# Patient Record
Sex: FEMALE | Race: WHITE | NOT HISPANIC OR LATINO | Employment: FULL TIME | URBAN - METROPOLITAN AREA
[De-identification: names, ages, dates, MRNs, and addresses within clinical notes are randomized per-mention and may not be internally consistent; named-entity substitution may affect disease eponyms.]

---

## 2018-03-09 ENCOUNTER — TRANSCRIBE ORDERS (OUTPATIENT)
Dept: LAB | Facility: HOSPITAL | Age: 23
End: 2018-03-09

## 2018-03-09 ENCOUNTER — LAB (OUTPATIENT)
Dept: LAB | Facility: HOSPITAL | Age: 23
End: 2018-03-09

## 2018-03-09 DIAGNOSIS — Z3A.15 15 WEEKS GESTATION OF PREGNANCY: Primary | ICD-10-CM

## 2018-03-09 DIAGNOSIS — Z34.82 PRENATAL CARE, SUBSEQUENT PREGNANCY, SECOND TRIMESTER: ICD-10-CM

## 2018-03-09 DIAGNOSIS — Z3A.15 15 WEEKS GESTATION OF PREGNANCY: ICD-10-CM

## 2018-03-09 LAB
ABO GROUP BLD: NORMAL
AMPHET+METHAMPHET UR QL: NEGATIVE
AMPHETAMINES UR QL: NEGATIVE
BARBITURATES UR QL SCN: NEGATIVE
BASOPHILS # BLD AUTO: 0.01 10*3/MM3 (ref 0–0.2)
BASOPHILS NFR BLD AUTO: 0.1 % (ref 0–1)
BENZODIAZ UR QL SCN: NEGATIVE
BILIRUB UR QL STRIP: NEGATIVE
BLD GP AB SCN SERPL QL: NEGATIVE
BUPRENORPHINE SERPL-MCNC: NEGATIVE NG/ML
CANNABINOIDS SERPL QL: NEGATIVE
CLARITY UR: CLEAR
COCAINE UR QL: NEGATIVE
COLOR UR: YELLOW
DEPRECATED RDW RBC AUTO: 40.5 FL (ref 37–54)
EOSINOPHIL # BLD AUTO: 0.13 10*3/MM3 (ref 0–0.3)
EOSINOPHIL NFR BLD AUTO: 1.5 % (ref 0–3)
ERYTHROCYTE [DISTWIDTH] IN BLOOD BY AUTOMATED COUNT: 13 % (ref 11.3–14.5)
GLUCOSE BLD-MCNC: 88 MG/DL (ref 70–100)
GLUCOSE UR STRIP-MCNC: NEGATIVE MG/DL
HBV SURFACE AB SER RIA-ACNC: REACTIVE
HBV SURFACE AG SERPL QL IA: NORMAL
HCT VFR BLD AUTO: 36.4 % (ref 34.5–44)
HCV AB SER DONR QL: NORMAL
HGB BLD-MCNC: 12.8 G/DL (ref 11.5–15.5)
HGB UR QL STRIP.AUTO: NEGATIVE
HIV1+2 AB SER QL: NORMAL
IMM GRANULOCYTES # BLD: 0.02 10*3/MM3 (ref 0–0.03)
IMM GRANULOCYTES NFR BLD: 0.2 % (ref 0–0.6)
KETONES UR QL STRIP: NEGATIVE
LEUKOCYTE ESTERASE UR QL STRIP.AUTO: NEGATIVE
LYMPHOCYTES # BLD AUTO: 1.6 10*3/MM3 (ref 0.6–4.8)
LYMPHOCYTES NFR BLD AUTO: 18.9 % (ref 24–44)
MCH RBC QN AUTO: 30 PG (ref 27–31)
MCHC RBC AUTO-ENTMCNC: 35.2 G/DL (ref 32–36)
MCV RBC AUTO: 85.4 FL (ref 80–99)
METHADONE UR QL SCN: NEGATIVE
MONOCYTES # BLD AUTO: 0.68 10*3/MM3 (ref 0–1)
MONOCYTES NFR BLD AUTO: 8 % (ref 0–12)
NEUTROPHILS # BLD AUTO: 6.02 10*3/MM3 (ref 1.5–8.3)
NEUTROPHILS NFR BLD AUTO: 71.3 % (ref 41–71)
NITRITE UR QL STRIP: NEGATIVE
OPIATES UR QL: NEGATIVE
OXYCODONE UR QL SCN: NEGATIVE
PCP UR QL SCN: NEGATIVE
PH UR STRIP.AUTO: 6.5 [PH] (ref 5–8)
PLATELET # BLD AUTO: 290 10*3/MM3 (ref 150–450)
PMV BLD AUTO: 9.5 FL (ref 6–12)
PROPOXYPH UR QL: NEGATIVE
PROT UR QL STRIP: NEGATIVE
RBC # BLD AUTO: 4.26 10*6/MM3 (ref 3.89–5.14)
RH BLD: POSITIVE
RUBV IGG SER QL: ABNORMAL
RUBV IGG SER-ACNC: 7.1 IU/ML
SP GR UR STRIP: 1.01 (ref 1–1.03)
TRICYCLICS UR QL SCN: NEGATIVE
UROBILINOGEN UR QL STRIP: NORMAL
WBC NRBC COR # BLD: 8.46 10*3/MM3 (ref 4.5–13.5)

## 2018-03-09 PROCEDURE — 80081 OBSTETRIC PANEL INC HIV TSTG: CPT

## 2018-03-09 PROCEDURE — 82947 ASSAY GLUCOSE BLOOD QUANT: CPT | Performed by: OBSTETRICS & GYNECOLOGY

## 2018-03-09 PROCEDURE — 86706 HEP B SURFACE ANTIBODY: CPT

## 2018-03-09 PROCEDURE — 80306 DRUG TEST PRSMV INSTRMNT: CPT

## 2018-03-09 PROCEDURE — 86803 HEPATITIS C AB TEST: CPT

## 2018-03-09 PROCEDURE — 81003 URINALYSIS AUTO W/O SCOPE: CPT | Performed by: OBSTETRICS & GYNECOLOGY

## 2018-03-12 LAB — RPR SER QL: NORMAL

## 2018-05-29 ENCOUNTER — TRANSCRIBE ORDERS (OUTPATIENT)
Dept: LAB | Facility: HOSPITAL | Age: 23
End: 2018-05-29

## 2018-05-29 ENCOUNTER — LAB (OUTPATIENT)
Dept: LAB | Facility: HOSPITAL | Age: 23
End: 2018-05-29

## 2018-05-29 DIAGNOSIS — Z34.83 PRENATAL CARE, SUBSEQUENT PREGNANCY, THIRD TRIMESTER: ICD-10-CM

## 2018-05-29 DIAGNOSIS — Z3A.28 28 WEEKS GESTATION OF PREGNANCY: ICD-10-CM

## 2018-05-29 DIAGNOSIS — Z3A.28 28 WEEKS GESTATION OF PREGNANCY: Primary | ICD-10-CM

## 2018-05-29 LAB
BLD GP AB SCN SERPL QL: NEGATIVE
DEPRECATED RDW RBC AUTO: 42.8 FL (ref 37–54)
ERYTHROCYTE [DISTWIDTH] IN BLOOD BY AUTOMATED COUNT: 13.1 % (ref 11.3–14.5)
GLUCOSE 1H P 100 G GLC PO SERPL-MCNC: 113 MG/DL (ref 65–140)
HCT VFR BLD AUTO: 35.6 % (ref 34.5–44)
HGB BLD-MCNC: 12.2 G/DL (ref 11.5–15.5)
MCH RBC QN AUTO: 30.4 PG (ref 27–31)
MCHC RBC AUTO-ENTMCNC: 34.3 G/DL (ref 32–36)
MCV RBC AUTO: 88.8 FL (ref 80–99)
PLATELET # BLD AUTO: 225 10*3/MM3 (ref 150–450)
PMV BLD AUTO: 9.6 FL (ref 6–12)
RBC # BLD AUTO: 4.01 10*6/MM3 (ref 3.89–5.14)
WBC NRBC COR # BLD: 10.55 10*3/MM3 (ref 4.5–13.5)

## 2018-05-29 PROCEDURE — 86850 RBC ANTIBODY SCREEN: CPT

## 2018-05-29 PROCEDURE — 36415 COLL VENOUS BLD VENIPUNCTURE: CPT | Performed by: OBSTETRICS & GYNECOLOGY

## 2018-05-29 PROCEDURE — 85027 COMPLETE CBC AUTOMATED: CPT

## 2018-05-29 PROCEDURE — 82950 GLUCOSE TEST: CPT | Performed by: OBSTETRICS & GYNECOLOGY

## 2018-07-27 LAB — EXTERNAL GROUP B STREP ANTIGEN: NEGATIVE

## 2018-08-15 ENCOUNTER — PREP FOR SURGERY (OUTPATIENT)
Dept: OTHER | Facility: HOSPITAL | Age: 23
End: 2018-08-15

## 2018-08-15 DIAGNOSIS — Z34.90 TERM PREGNANCY: Primary | ICD-10-CM

## 2018-08-15 RX ORDER — SODIUM CHLORIDE, SODIUM LACTATE, POTASSIUM CHLORIDE, CALCIUM CHLORIDE 600; 310; 30; 20 MG/100ML; MG/100ML; MG/100ML; MG/100ML
125 INJECTION, SOLUTION INTRAVENOUS CONTINUOUS
Status: CANCELLED | OUTPATIENT
Start: 2018-08-15

## 2018-08-15 RX ORDER — MAGNESIUM CARB/ALUMINUM HYDROX 105-160MG
30 TABLET,CHEWABLE ORAL ONCE
Status: CANCELLED | OUTPATIENT
Start: 2018-08-15 | End: 2018-08-15

## 2018-08-15 RX ORDER — OXYTOCIN-SODIUM CHLORIDE 0.9% IV SOLN 30 UNIT/500ML 30-0.9/5 UT/ML-%
650 SOLUTION INTRAVENOUS ONCE
Status: CANCELLED | OUTPATIENT
Start: 2018-08-15

## 2018-08-15 RX ORDER — TERBUTALINE SULFATE 1 MG/ML
0.25 INJECTION, SOLUTION SUBCUTANEOUS AS NEEDED
Status: CANCELLED | OUTPATIENT
Start: 2018-08-15

## 2018-08-15 RX ORDER — CARBOPROST TROMETHAMINE 250 UG/ML
250 INJECTION, SOLUTION INTRAMUSCULAR AS NEEDED
Status: CANCELLED | OUTPATIENT
Start: 2018-08-15

## 2018-08-15 RX ORDER — ONDANSETRON 4 MG/1
4 TABLET, FILM COATED ORAL EVERY 6 HOURS PRN
Status: CANCELLED | OUTPATIENT
Start: 2018-08-15

## 2018-08-15 RX ORDER — ACETAMINOPHEN 325 MG/1
650 TABLET ORAL EVERY 4 HOURS PRN
Status: CANCELLED | OUTPATIENT
Start: 2018-08-15

## 2018-08-15 RX ORDER — LIDOCAINE HYDROCHLORIDE 10 MG/ML
5 INJECTION, SOLUTION EPIDURAL; INFILTRATION; INTRACAUDAL; PERINEURAL AS NEEDED
Status: CANCELLED | OUTPATIENT
Start: 2018-08-15

## 2018-08-15 RX ORDER — OXYTOCIN-SODIUM CHLORIDE 0.9% IV SOLN 30 UNIT/500ML 30-0.9/5 UT/ML-%
85 SOLUTION INTRAVENOUS ONCE
Status: CANCELLED | OUTPATIENT
Start: 2018-08-15

## 2018-08-15 RX ORDER — OXYTOCIN-SODIUM CHLORIDE 0.9% IV SOLN 30 UNIT/500ML 30-0.9/5 UT/ML-%
2-24 SOLUTION INTRAVENOUS
Status: CANCELLED | OUTPATIENT
Start: 2018-08-16

## 2018-08-15 RX ORDER — HYDROCODONE BITARTRATE AND ACETAMINOPHEN 5; 325 MG/1; MG/1
1 TABLET ORAL EVERY 4 HOURS PRN
Status: CANCELLED | OUTPATIENT
Start: 2018-08-15 | End: 2018-08-25

## 2018-08-15 RX ORDER — MISOPROSTOL 200 UG/1
800 TABLET ORAL AS NEEDED
Status: CANCELLED | OUTPATIENT
Start: 2018-08-15

## 2018-08-15 RX ORDER — ONDANSETRON 2 MG/ML
4 INJECTION INTRAMUSCULAR; INTRAVENOUS EVERY 6 HOURS PRN
Status: CANCELLED | OUTPATIENT
Start: 2018-08-15

## 2018-08-15 RX ORDER — SODIUM CHLORIDE 0.9 % (FLUSH) 0.9 %
1-10 SYRINGE (ML) INJECTION AS NEEDED
Status: CANCELLED | OUTPATIENT
Start: 2018-08-15

## 2018-08-15 RX ORDER — METHYLERGONOVINE MALEATE 0.2 MG/ML
200 INJECTION INTRAVENOUS ONCE AS NEEDED
Status: CANCELLED | OUTPATIENT
Start: 2018-08-15

## 2018-08-15 RX ORDER — BUTORPHANOL TARTRATE 1 MG/ML
1 INJECTION, SOLUTION INTRAMUSCULAR; INTRAVENOUS
Status: CANCELLED | OUTPATIENT
Start: 2018-08-15

## 2018-08-15 RX ORDER — IBUPROFEN 600 MG/1
600 TABLET ORAL EVERY 6 HOURS PRN
Status: CANCELLED | OUTPATIENT
Start: 2018-08-15

## 2018-08-15 RX ORDER — ZOLPIDEM TARTRATE 5 MG/1
5 TABLET ORAL NIGHTLY PRN
Status: CANCELLED | OUTPATIENT
Start: 2018-08-15 | End: 2018-08-25

## 2018-08-21 ENCOUNTER — APPOINTMENT (OUTPATIENT)
Dept: LABOR AND DELIVERY | Facility: HOSPITAL | Age: 23
End: 2018-08-21

## 2018-08-21 ENCOUNTER — HOSPITAL ENCOUNTER (INPATIENT)
Dept: LABOR AND DELIVERY | Facility: HOSPITAL | Age: 23
LOS: 3 days | Discharge: HOME | End: 2018-08-24
Attending: OBSTETRICS & GYNECOLOGY | Admitting: OBSTETRICS & GYNECOLOGY

## 2018-08-21 DIAGNOSIS — Z34.90 TERM PREGNANCY: ICD-10-CM

## 2018-08-21 LAB
DEPRECATED RDW RBC AUTO: 44.1 FL (ref 37–54)
ERYTHROCYTE [DISTWIDTH] IN BLOOD BY AUTOMATED COUNT: 13.6 % (ref 11.3–14.5)
HCT VFR BLD AUTO: 36.4 % (ref 34.5–44)
HGB BLD-MCNC: 12.4 G/DL (ref 11.5–15.5)
MCH RBC QN AUTO: 30.2 PG (ref 27–31)
MCHC RBC AUTO-ENTMCNC: 34.1 G/DL (ref 32–36)
MCV RBC AUTO: 88.6 FL (ref 80–99)
PLATELET # BLD AUTO: 176 10*3/MM3 (ref 150–450)
PMV BLD AUTO: 11.9 FL (ref 6–12)
RBC # BLD AUTO: 4.11 10*6/MM3 (ref 3.89–5.14)
WBC NRBC COR # BLD: 10.75 10*3/MM3 (ref 4.5–13.5)

## 2018-08-21 PROCEDURE — 86901 BLOOD TYPING SEROLOGIC RH(D): CPT | Performed by: OBSTETRICS & GYNECOLOGY

## 2018-08-21 PROCEDURE — 86850 RBC ANTIBODY SCREEN: CPT | Performed by: OBSTETRICS & GYNECOLOGY

## 2018-08-21 PROCEDURE — 85027 COMPLETE CBC AUTOMATED: CPT | Performed by: OBSTETRICS & GYNECOLOGY

## 2018-08-21 PROCEDURE — 59200 INSERT CERVICAL DILATOR: CPT | Performed by: OBSTETRICS & GYNECOLOGY

## 2018-08-21 PROCEDURE — 86900 BLOOD TYPING SEROLOGIC ABO: CPT | Performed by: OBSTETRICS & GYNECOLOGY

## 2018-08-21 RX ORDER — BUTORPHANOL TARTRATE 1 MG/ML
1 INJECTION, SOLUTION INTRAMUSCULAR; INTRAVENOUS
Status: DISCONTINUED | OUTPATIENT
Start: 2018-08-21 | End: 2018-08-22 | Stop reason: HOSPADM

## 2018-08-21 RX ORDER — PRENATAL WITH FERROUS FUM AND FOLIC ACID 3080; 920; 120; 400; 22; 1.84; 3; 20; 10; 1; 12; 200; 27; 25; 2 [IU]/1; [IU]/1; MG/1; [IU]/1; MG/1; MG/1; MG/1; MG/1; MG/1; MG/1; UG/1; MG/1; MG/1; MG/1; MG/1
1 TABLET ORAL DAILY
COMMUNITY
End: 2021-08-18

## 2018-08-21 RX ORDER — ONDANSETRON 4 MG/1
4 TABLET, FILM COATED ORAL EVERY 6 HOURS PRN
Status: DISCONTINUED | OUTPATIENT
Start: 2018-08-21 | End: 2018-08-22 | Stop reason: HOSPADM

## 2018-08-21 RX ORDER — ONDANSETRON 2 MG/ML
4 INJECTION INTRAMUSCULAR; INTRAVENOUS EVERY 6 HOURS PRN
Status: DISCONTINUED | OUTPATIENT
Start: 2018-08-21 | End: 2018-08-22 | Stop reason: HOSPADM

## 2018-08-21 RX ORDER — TERBUTALINE SULFATE 1 MG/ML
0.25 INJECTION, SOLUTION SUBCUTANEOUS AS NEEDED
Status: DISCONTINUED | OUTPATIENT
Start: 2018-08-21 | End: 2018-08-22 | Stop reason: HOSPADM

## 2018-08-21 RX ORDER — ACETAMINOPHEN 325 MG/1
650 TABLET ORAL EVERY 4 HOURS PRN
Status: DISCONTINUED | OUTPATIENT
Start: 2018-08-21 | End: 2018-08-22 | Stop reason: HOSPADM

## 2018-08-21 RX ORDER — MAGNESIUM CARB/ALUMINUM HYDROX 105-160MG
30 TABLET,CHEWABLE ORAL ONCE
Status: COMPLETED | OUTPATIENT
Start: 2018-08-21 | End: 2018-08-22

## 2018-08-21 RX ORDER — ZOLPIDEM TARTRATE 5 MG/1
5 TABLET ORAL NIGHTLY PRN
Status: DISCONTINUED | OUTPATIENT
Start: 2018-08-21 | End: 2018-08-22 | Stop reason: HOSPADM

## 2018-08-21 RX ORDER — LIDOCAINE HYDROCHLORIDE 10 MG/ML
5 INJECTION, SOLUTION EPIDURAL; INFILTRATION; INTRACAUDAL; PERINEURAL AS NEEDED
Status: DISCONTINUED | OUTPATIENT
Start: 2018-08-21 | End: 2018-08-22 | Stop reason: HOSPADM

## 2018-08-21 RX ORDER — SODIUM CHLORIDE, SODIUM LACTATE, POTASSIUM CHLORIDE, CALCIUM CHLORIDE 600; 310; 30; 20 MG/100ML; MG/100ML; MG/100ML; MG/100ML
125 INJECTION, SOLUTION INTRAVENOUS CONTINUOUS
Status: DISCONTINUED | OUTPATIENT
Start: 2018-08-21 | End: 2018-08-23

## 2018-08-21 RX ORDER — SODIUM CHLORIDE 0.9 % (FLUSH) 0.9 %
1-10 SYRINGE (ML) INJECTION AS NEEDED
Status: DISCONTINUED | OUTPATIENT
Start: 2018-08-21 | End: 2018-08-22 | Stop reason: HOSPADM

## 2018-08-21 RX ORDER — OXYTOCIN-SODIUM CHLORIDE 0.9% IV SOLN 30 UNIT/500ML 30-0.9/5 UT/ML-%
2-30 SOLUTION INTRAVENOUS
Status: DISCONTINUED | OUTPATIENT
Start: 2018-08-22 | End: 2018-08-22 | Stop reason: HOSPADM

## 2018-08-21 RX ADMIN — SODIUM CHLORIDE, POTASSIUM CHLORIDE, SODIUM LACTATE AND CALCIUM CHLORIDE 125 ML/HR: 600; 310; 30; 20 INJECTION, SOLUTION INTRAVENOUS at 23:04

## 2018-08-22 ENCOUNTER — ANESTHESIA (OUTPATIENT)
Dept: LABOR AND DELIVERY | Facility: HOSPITAL | Age: 23
End: 2018-08-22

## 2018-08-22 ENCOUNTER — ANESTHESIA EVENT (OUTPATIENT)
Dept: LABOR AND DELIVERY | Facility: HOSPITAL | Age: 23
End: 2018-08-22

## 2018-08-22 LAB
ABO GROUP BLD: NORMAL
BLD GP AB SCN SERPL QL: NEGATIVE
RH BLD: POSITIVE
T&S EXPIRATION DATE: NORMAL

## 2018-08-22 PROCEDURE — 59025 FETAL NON-STRESS TEST: CPT

## 2018-08-22 PROCEDURE — C1755 CATHETER, INTRASPINAL: HCPCS | Performed by: ANESTHESIOLOGY

## 2018-08-22 PROCEDURE — 25010000002 AMPICILLIN PER 500 MG: Performed by: OBSTETRICS & GYNECOLOGY

## 2018-08-22 PROCEDURE — C1755 CATHETER, INTRASPINAL: HCPCS

## 2018-08-22 PROCEDURE — 25010000002 FENTANYL CITRATE (PF) 100 MCG/2ML SOLUTION: Performed by: NURSE ANESTHETIST, CERTIFIED REGISTERED

## 2018-08-22 PROCEDURE — 0HQ9XZZ REPAIR PERINEUM SKIN, EXTERNAL APPROACH: ICD-10-PCS | Performed by: OBSTETRICS & GYNECOLOGY

## 2018-08-22 PROCEDURE — 88307 TISSUE EXAM BY PATHOLOGIST: CPT | Performed by: OBSTETRICS & GYNECOLOGY

## 2018-08-22 PROCEDURE — 25010000002 ROPIVACAINE PER 1 MG: Performed by: NURSE ANESTHETIST, CERTIFIED REGISTERED

## 2018-08-22 RX ORDER — MISOPROSTOL 200 UG/1
800 TABLET ORAL AS NEEDED
Status: DISCONTINUED | OUTPATIENT
Start: 2018-08-22 | End: 2018-08-22 | Stop reason: HOSPADM

## 2018-08-22 RX ORDER — IBUPROFEN 600 MG/1
600 TABLET ORAL EVERY 6 HOURS PRN
Status: DISCONTINUED | OUTPATIENT
Start: 2018-08-22 | End: 2018-08-22 | Stop reason: HOSPADM

## 2018-08-22 RX ORDER — DIPHENHYDRAMINE HYDROCHLORIDE 50 MG/ML
12.5 INJECTION INTRAMUSCULAR; INTRAVENOUS EVERY 8 HOURS PRN
Status: DISCONTINUED | OUTPATIENT
Start: 2018-08-22 | End: 2018-08-22 | Stop reason: HOSPADM

## 2018-08-22 RX ORDER — LIDOCAINE HYDROCHLORIDE AND EPINEPHRINE 20; 5 MG/ML; UG/ML
INJECTION, SOLUTION EPIDURAL; INFILTRATION; INTRACAUDAL; PERINEURAL AS NEEDED
Status: DISCONTINUED | OUTPATIENT
Start: 2018-08-22 | End: 2018-08-22 | Stop reason: SURG

## 2018-08-22 RX ORDER — OXYTOCIN-SODIUM CHLORIDE 0.9% IV SOLN 30 UNIT/500ML 30-0.9/5 UT/ML-%
650 SOLUTION INTRAVENOUS ONCE
Status: COMPLETED | OUTPATIENT
Start: 2018-08-22 | End: 2018-08-22

## 2018-08-22 RX ORDER — ROPIVACAINE HYDROCHLORIDE 2 MG/ML
15 INJECTION, SOLUTION EPIDURAL; INFILTRATION; PERINEURAL CONTINUOUS
Status: DISCONTINUED | OUTPATIENT
Start: 2018-08-22 | End: 2018-08-23

## 2018-08-22 RX ORDER — FENTANYL CITRATE 50 UG/ML
INJECTION, SOLUTION INTRAMUSCULAR; INTRAVENOUS AS NEEDED
Status: DISCONTINUED | OUTPATIENT
Start: 2018-08-22 | End: 2018-08-22 | Stop reason: SURG

## 2018-08-22 RX ORDER — ONDANSETRON 2 MG/ML
4 INJECTION INTRAMUSCULAR; INTRAVENOUS ONCE AS NEEDED
Status: DISCONTINUED | OUTPATIENT
Start: 2018-08-22 | End: 2018-08-22 | Stop reason: HOSPADM

## 2018-08-22 RX ORDER — TRISODIUM CITRATE DIHYDRATE AND CITRIC ACID MONOHYDRATE 500; 334 MG/5ML; MG/5ML
30 SOLUTION ORAL ONCE
Status: DISCONTINUED | OUTPATIENT
Start: 2018-08-22 | End: 2018-08-22 | Stop reason: HOSPADM

## 2018-08-22 RX ORDER — ACETAMINOPHEN 500 MG
1000 TABLET ORAL ONCE
Status: COMPLETED | OUTPATIENT
Start: 2018-08-22 | End: 2018-08-22

## 2018-08-22 RX ORDER — METHYLERGONOVINE MALEATE 0.2 MG/ML
200 INJECTION INTRAVENOUS ONCE AS NEEDED
Status: DISCONTINUED | OUTPATIENT
Start: 2018-08-22 | End: 2018-08-22 | Stop reason: HOSPADM

## 2018-08-22 RX ORDER — FAMOTIDINE 10 MG/ML
20 INJECTION, SOLUTION INTRAVENOUS ONCE AS NEEDED
Status: DISCONTINUED | OUTPATIENT
Start: 2018-08-22 | End: 2018-08-22 | Stop reason: HOSPADM

## 2018-08-22 RX ORDER — EPHEDRINE SULFATE/0.9% NACL/PF 50 MG/10ML
5 SYRINGE (ML) INTRAVENOUS
Status: DISCONTINUED | OUTPATIENT
Start: 2018-08-22 | End: 2018-08-22 | Stop reason: HOSPADM

## 2018-08-22 RX ORDER — GENTAMICIN SULFATE 60 MG/50ML
2 INJECTION, SOLUTION INTRAVENOUS ONCE
Status: DISCONTINUED | OUTPATIENT
Start: 2018-08-22 | End: 2018-08-23

## 2018-08-22 RX ORDER — HYDROCODONE BITARTRATE AND ACETAMINOPHEN 5; 325 MG/1; MG/1
1 TABLET ORAL EVERY 4 HOURS PRN
Status: DISCONTINUED | OUTPATIENT
Start: 2018-08-22 | End: 2018-08-22 | Stop reason: HOSPADM

## 2018-08-22 RX ORDER — CARBOPROST TROMETHAMINE 250 UG/ML
250 INJECTION, SOLUTION INTRAMUSCULAR AS NEEDED
Status: DISCONTINUED | OUTPATIENT
Start: 2018-08-22 | End: 2018-08-22 | Stop reason: HOSPADM

## 2018-08-22 RX ORDER — METOCLOPRAMIDE HYDROCHLORIDE 5 MG/ML
10 INJECTION INTRAMUSCULAR; INTRAVENOUS ONCE AS NEEDED
Status: DISCONTINUED | OUTPATIENT
Start: 2018-08-22 | End: 2018-08-22 | Stop reason: HOSPADM

## 2018-08-22 RX ORDER — OXYTOCIN-SODIUM CHLORIDE 0.9% IV SOLN 30 UNIT/500ML 30-0.9/5 UT/ML-%
85 SOLUTION INTRAVENOUS ONCE
Status: COMPLETED | OUTPATIENT
Start: 2018-08-22 | End: 2018-08-22

## 2018-08-22 RX ADMIN — SODIUM CHLORIDE, POTASSIUM CHLORIDE, SODIUM LACTATE AND CALCIUM CHLORIDE 125 ML/HR: 600; 310; 30; 20 INJECTION, SOLUTION INTRAVENOUS at 05:44

## 2018-08-22 RX ADMIN — MINERAL OIL 30 ML: 1000 SOLUTION ORAL at 20:35

## 2018-08-22 RX ADMIN — ROPIVACAINE HYDROCHLORIDE 15 ML/HR: 2 INJECTION, SOLUTION EPIDURAL; INFILTRATION at 13:00

## 2018-08-22 RX ADMIN — AMPICILLIN SODIUM 2 G: 2 INJECTION, POWDER, FOR SOLUTION INTRAMUSCULAR; INTRAVENOUS at 19:53

## 2018-08-22 RX ADMIN — FENTANYL CITRATE 100 MCG: 50 INJECTION, SOLUTION INTRAMUSCULAR; INTRAVENOUS at 12:55

## 2018-08-22 RX ADMIN — SODIUM CHLORIDE, POTASSIUM CHLORIDE, SODIUM LACTATE AND CALCIUM CHLORIDE 1000 ML/HR: 600; 310; 30; 20 INJECTION, SOLUTION INTRAVENOUS at 12:28

## 2018-08-22 RX ADMIN — OXYTOCIN-SODIUM CHLORIDE 0.9% IV SOLN 30 UNIT/500ML 85 ML/HR: 30-0.9/5 SOLUTION at 21:02

## 2018-08-22 RX ADMIN — LIDOCAINE HYDROCHLORIDE,EPINEPHRINE BITARTRATE 2 ML: 20; .005 INJECTION, SOLUTION EPIDURAL; INFILTRATION; INTRACAUDAL; PERINEURAL at 12:53

## 2018-08-22 RX ADMIN — OXYTOCIN-SODIUM CHLORIDE 0.9% IV SOLN 30 UNIT/500ML 650 ML/HR: 30-0.9/5 SOLUTION at 20:50

## 2018-08-22 RX ADMIN — ROPIVACAINE HYDROCHLORIDE 8 ML: 5 INJECTION, SOLUTION EPIDURAL; INFILTRATION; PERINEURAL at 12:57

## 2018-08-22 RX ADMIN — SODIUM CHLORIDE, POTASSIUM CHLORIDE, SODIUM LACTATE AND CALCIUM CHLORIDE 125 ML/HR: 600; 310; 30; 20 INJECTION, SOLUTION INTRAVENOUS at 12:54

## 2018-08-22 RX ADMIN — ACETAMINOPHEN 1000 MG: 500 TABLET, FILM COATED ORAL at 19:52

## 2018-08-22 RX ADMIN — OXYTOCIN-SODIUM CHLORIDE 0.9% IV SOLN 30 UNIT/500ML 2 MILLI-UNITS/MIN: 30-0.9/5 SOLUTION at 06:24

## 2018-08-22 NOTE — PLAN OF CARE
Problem: Patient Care Overview  Goal: Plan of Care Review  Outcome: Ongoing (interventions implemented as appropriate)   08/22/18 0826   Coping/Psychosocial   Plan of Care Reviewed With patient   Plan of Care Review   Progress improving     Goal: Individualization and Mutuality  Outcome: Ongoing (interventions implemented as appropriate)   08/22/18 0826   Individualization   Patient Specific Goals (Include Timeframe) healthy baby and healthy mommy     Goal: Discharge Needs Assessment  Outcome: Ongoing (interventions implemented as appropriate)   08/22/18 0826   Discharge Needs Assessment   Concerns to be Addressed no discharge needs identified     Goal: Interprofessional Rounds/Family Conf  Outcome: Ongoing (interventions implemented as appropriate)   08/22/18 0826   Interdisciplinary Rounds/Family Conf   Participants nursing       Problem: Labor (Cervical Ripen, Induct, Augment) (Adult,Obstetrics,Pediatric)  Goal: Signs and Symptoms of Listed Potential Problems Will be Absent, Minimized or Managed (Labor)  Outcome: Ongoing (interventions implemented as appropriate)   08/22/18 0826   Goal/Outcome Evaluation   Problems Assessed (Labor) all   Problems Present (Labor) pain

## 2018-08-22 NOTE — ANESTHESIA PREPROCEDURE EVALUATION
Anesthesia Evaluation     Patient summary reviewed and Nursing notes reviewed   NPO Solid Status: > 6 hours  NPO Liquid Status: > 6 hours           Airway   Mallampati: II  TM distance: >3 FB  Neck ROM: full  Dental      Pulmonary - negative pulmonary ROS   Cardiovascular - negative cardio ROS        Neuro/Psych- negative ROS  GI/Hepatic/Renal/Endo - negative ROS     Musculoskeletal (-) negative ROS    Abdominal    Substance History - negative use     OB/GYN    (+) Pregnant,         Other - negative ROS                       Anesthesia Plan    ASA 2     epidural     Anesthetic plan and risks discussed with patient.

## 2018-08-22 NOTE — NURSING NOTE
1600- new orders per md that may increase pitocin per protocol to 30 mu/min  1855- called dr. khalil to review efm tracing. Discussed interventions of position changes and decreasing pitocin. No new orders at this time. Continue to monitor

## 2018-08-22 NOTE — PROCEDURES
22 y.o.  OB History      Para Term  AB Living    1              SAB TAB Ectopic Molar Multiple Live Births                      Presents as an induction of labour due to unfavourable cervix  Her primary OB requests a Anderson Bulb placement to initiate the induction of labour.    Fetal Heart Rate Assessment   Method:     Beats/min:     Baseline:  150s   Varibility:  mod   Accels:  y   Decels:  n   Tracing Category:  1     TOCO:  Irregular  SVE:  Closed/70/-2    A Anderson Bulb was placed without difficulties with 60 cc of sterile saline.  The patient tolerated the procedure well.

## 2018-08-22 NOTE — PROGRESS NOTES
"08/22/18  4:23 PM  Lara Mclaughlin    SUBJECTIVE: comfortable w/ epidural     ASSESSMENTS:     /64   Pulse 83   Temp 98 °F (36.7 °C)   Resp 16   Ht 162.6 cm (64\")   Wt 73 kg (161 lb)   Breastfeeding? Yes   BMI 27.64 kg/m²     Fetal Heart Rate Assessment   Method: Fetal HR Assessment Method: external   Beats/min: Fetal HR (beats/min): 130   Baseline: Fetal Heart Baseline Rate: normal range   Varibility: Fetal HR Variability: moderate (amplitude range 6 to 25 bpm)   Accels: Fetal HR Accelerations: 15x15   Decels: Fetal HR Decelerations: absent   Tracing Category:  1     Uterine Assessment   Method: Method: IUPC (intrauterine pressure catheter)   Frequency (min): Contraction Frequency (Minutes): 1-3   Ctx Count in 10 min:     Duration:     Intensity: Contraction Intensity: strong by palpation   Intensity by IUPC:     Resting Tone: Uterine Resting Tone: soft by palpation   Resting Tone by IUPC:       Presentation: vtx   Cervix: Exam by: Method: sterile exam per physician   Dilation:  6   Effacement: Cervical Effacement: 90%   Station:  -1    IUPC placed            PLAN  Increased pitocin per protocol  IUPC placed to better titrate  Epidural in place  GBS neg    Emi Zambrano MD  4:23 PM  08/22/18       "

## 2018-08-22 NOTE — ANESTHESIA PROCEDURE NOTES
Labor Epidural    Patient location during procedure: OB  Performed By  Anesthesiologist: CHRIST MACHADO  CRNA: DINA CRUZ  Preanesthetic Checklist  Completed: patient identified, surgical consent, pre-op evaluation, timeout performed, IV checked, risks and benefits discussed and monitors and equipment checked  Prep:  Pt Position:sitting  Sterile Tech:cap, gloves, mask and sterile barrier  Prep:DuraPrep  Monitoring:blood pressure monitoring  Epidural Block Procedure:  Approach:midline  Guidance:palpation technique  Location:L3-L4  Needle Type:Tuohy  Needle Gauge:17 G  Loss of Resistance Medium: saline  Cath Depth at skin:9 cm  Paresthesia: none  Aspiration:negative  Test Dose:negative  Number of Attempts: 1  Post Assessment:  Dressing:occlusive dressing applied and secured with tape  Pt Tolerance:patient tolerated the procedure well with no apparent complications  Complications:no

## 2018-08-22 NOTE — H&P
Sathya  Obstetric History and Physical    Chief Complaint   Patient presents with   • Scheduled Induction       Subjective     Patient is a 22 y.o. female  currently at 40w1d, who presents for induction of labor  for post due date  with unfavorable cervix.    Her prenatal care is benign.  Her previous obstetric/gynecological history is noted for is non-contributory.    The following portions of the patients history were reviewed and updated as appropriate: current medications, allergies, past medical history, past surgical history, past family history, past social history and problem list .       Prenatal Information:   Maternal Prenatal Labs  Blood Type ABO Type   Date Value Ref Range Status   2018 O  Final      Rh Status RH type   Date Value Ref Range Status   2018 Positive  Final      Antibody Screen Antibody Screen   Date Value Ref Range Status   2018 Negative  Final                        Past OB History:       Obstetric History       T0      L0     SAB0   TAB0   Ectopic0   Molar0   Multiple0   Live Births0       # Outcome Date GA Lbr Zeb/2nd Weight Sex Delivery Anes PTL Lv   1 Current                   Past Medical History: History reviewed. No pertinent past medical history.   Past Surgical History History reviewed. No pertinent surgical history.   Family History: History reviewed. No pertinent family history.   Social History:  reports that she has never smoked. She has never used smokeless tobacco.   reports that she does not drink alcohol.   reports that she does not use drugs.        REVIEW OF SYSTEMS              Reports fetal movement is normal             Denies leakage of amniotic fluid.             Denies vaginal bleeding             She reports No contractions             All other systems reviewed and are negative    Objective       Vital Signs Range for the last 24 hours  Temperature: Temp:  [97.7 °F (36.5 °C)-98.1 °F (36.7 °C)] 97.8 °F (36.6 °C)   Temp  Source: Temp src: Oral   BP: BP: (0-128)/(0-79) 120/79   Pulse: Heart Rate:  [66-88] 88   Respirations: Resp:  [16] 16   SPO2:     O2 Amount (l/min):     O2 Devices     Weight: Weight:  [73 kg (161 lb)] 73 kg (161 lb)       Presentation: vertex   Cervix: Exam by:     Dilation:  6   Effacement:  90   Station:  -1    AROM w/ scant clear fluid       Fetal Heart Rate Assessment   Method:     Beats/min:     Baseline:  130   Varibility:  moderate   Accels:  15x15   Decels:  absent   Tracing Category:  1    NST: REACTIVE     Uterine Assessment : q4min              Constitutional:  Well developed, well nourished, no acute distress, well-groomed.   Respiratory:  Lungs are clear to auscultation bilaterally, normal breath sounds.   Cardiovascular:  Normal rate and rhythm, no murmurs.   Gastrointestinal:  Soft, gravid, nontender.  Uterus: Soft, nontender. Fundus appropriate for dates.  Neurologic:  Alert & oriented x 3,  no focal deficits noted.   Psychiatric:  Speech and behavior appropriate.   Extremities: no cyanosis, clubbing or edema, no evidence of DVT.        Labs:  Lab Results   Component Value Date    WBC 10.75 08/21/2018    HGB 12.4 08/21/2018    HCT 36.4 08/21/2018    MCV 88.6 08/21/2018     08/21/2018       Results from last 7 days  Lab Units 08/21/18  2342   ABO TYPING  O   RH TYPING  Positive   ANTIBODY SCREEN  Negative           Assessment/Plan     Active Problems:    Term pregnancy        Assessment:  1.  Intrauterine pregnancy at 40w1d weeks gestation with reactive fetal status.    2.   induction of labor  for post due date  with unfavorable cervix  3.  Obstetrical history significant for is non-contributory.  4.  GBS status: negative    Plan:  1.Anderson bulb for cervical ripening overnight. S/p AROM.  Increase pitocin per protocol. Epidural prn.  2. Plan of care has been reviewed with patient and questions answered.  3.  Risks, benefits of treatment plan have been discussed.  4.  All questions have been  answered.        Emi Zambrano MD  8/22/2018  11:17 AM

## 2018-08-23 LAB
HCT VFR BLD AUTO: 33.3 % (ref 34.5–44)
HGB BLD-MCNC: 11.3 G/DL (ref 11.5–15.5)

## 2018-08-23 PROCEDURE — 85018 HEMOGLOBIN: CPT | Performed by: OBSTETRICS & GYNECOLOGY

## 2018-08-23 PROCEDURE — 85014 HEMATOCRIT: CPT | Performed by: OBSTETRICS & GYNECOLOGY

## 2018-08-23 RX ORDER — ONDANSETRON 4 MG/1
4 TABLET, FILM COATED ORAL EVERY 6 HOURS PRN
Status: DISCONTINUED | OUTPATIENT
Start: 2018-08-23 | End: 2018-08-24 | Stop reason: HOSPADM

## 2018-08-23 RX ORDER — ACETAMINOPHEN 325 MG/1
650 TABLET ORAL EVERY 4 HOURS PRN
Status: DISCONTINUED | OUTPATIENT
Start: 2018-08-23 | End: 2018-08-24 | Stop reason: HOSPADM

## 2018-08-23 RX ORDER — LANOLIN 100 %
OINTMENT (GRAM) TOPICAL
Status: DISCONTINUED | OUTPATIENT
Start: 2018-08-23 | End: 2018-08-24 | Stop reason: HOSPADM

## 2018-08-23 RX ORDER — BISACODYL 10 MG
10 SUPPOSITORY, RECTAL RECTAL DAILY PRN
Status: DISCONTINUED | OUTPATIENT
Start: 2018-08-23 | End: 2018-08-24 | Stop reason: HOSPADM

## 2018-08-23 RX ORDER — SODIUM CHLORIDE 0.9 % (FLUSH) 0.9 %
1-10 SYRINGE (ML) INJECTION AS NEEDED
Status: DISCONTINUED | OUTPATIENT
Start: 2018-08-23 | End: 2018-08-24 | Stop reason: HOSPADM

## 2018-08-23 RX ORDER — HYDROCODONE BITARTRATE AND ACETAMINOPHEN 5; 325 MG/1; MG/1
1 TABLET ORAL EVERY 4 HOURS PRN
Status: DISCONTINUED | OUTPATIENT
Start: 2018-08-23 | End: 2018-08-24 | Stop reason: HOSPADM

## 2018-08-23 RX ORDER — DOCUSATE SODIUM 100 MG/1
100 CAPSULE, LIQUID FILLED ORAL 2 TIMES DAILY PRN
Status: DISCONTINUED | OUTPATIENT
Start: 2018-08-23 | End: 2018-08-24 | Stop reason: HOSPADM

## 2018-08-23 RX ORDER — SIMETHICONE 80 MG
80 TABLET,CHEWABLE ORAL 4 TIMES DAILY PRN
Status: DISCONTINUED | OUTPATIENT
Start: 2018-08-23 | End: 2018-08-24 | Stop reason: HOSPADM

## 2018-08-23 RX ORDER — IBUPROFEN 600 MG/1
600 TABLET ORAL EVERY 6 HOURS PRN
Status: DISCONTINUED | OUTPATIENT
Start: 2018-08-23 | End: 2018-08-24 | Stop reason: HOSPADM

## 2018-08-23 RX ORDER — ONDANSETRON 2 MG/ML
4 INJECTION INTRAMUSCULAR; INTRAVENOUS EVERY 6 HOURS PRN
Status: DISCONTINUED | OUTPATIENT
Start: 2018-08-23 | End: 2018-08-24 | Stop reason: HOSPADM

## 2018-08-23 RX ORDER — PRENATAL VIT/IRON FUM/FOLIC AC 27MG-0.8MG
1 TABLET ORAL DAILY
Status: DISCONTINUED | OUTPATIENT
Start: 2018-08-23 | End: 2018-08-24 | Stop reason: HOSPADM

## 2018-08-23 RX ADMIN — PRENATAL VIT W/ FE FUMARATE-FA TAB 27-0.8 MG 1 TABLET: 27-0.8 TAB at 08:36

## 2018-08-23 RX ADMIN — IBUPROFEN 600 MG: 600 TABLET ORAL at 12:21

## 2018-08-23 RX ADMIN — SIMETHICONE CHEW TAB 80 MG 80 MG: 80 TABLET ORAL at 08:36

## 2018-08-23 RX ADMIN — DOCUSATE SODIUM 100 MG: 100 CAPSULE, LIQUID FILLED ORAL at 06:04

## 2018-08-23 RX ADMIN — HYDROCORTISONE 2.5% 1 APPLICATION: 25 CREAM TOPICAL at 00:37

## 2018-08-23 RX ADMIN — WITCH HAZEL 1 PAD: 500 SOLUTION RECTAL; TOPICAL at 00:37

## 2018-08-23 RX ADMIN — IBUPROFEN 600 MG: 600 TABLET ORAL at 06:04

## 2018-08-23 RX ADMIN — Medication: at 00:37

## 2018-08-23 NOTE — LACTATION NOTE
08/23/18 1735   Maternal Information   Date of Referral 08/23/18   Person Making Referral (courtesy consult)   Maternal Assessment   Breast Size Issue none   Breast Shape Bilateral:;round   Reproductive Interventions   Breastfeeding Support diary/feeding log utilized;encouragement provided;infant-mother separation minimized;lactation counseling provided   Mom states breastfeeding is going well. Baby has had several stool diapers but no one has noticed a void since delivery. Discussed adequate void and stools. Teaching done as documented under Education.Has breast pump at home and will bring to hospital in case it is needed. Left mom and baby in skin to skin and encouraged as much skin to skin as possible. To call for lactation services, if there are questions or concerns

## 2018-08-23 NOTE — ANESTHESIA POSTPROCEDURE EVALUATION
Patient: Lara Mclaughlin    Procedure Summary     Date:  08/22/18 Room / Location:      Anesthesia Start:  1245 Anesthesia Stop:  2046    Procedure:  LABOR ANALGESIA Diagnosis:      Scheduled Providers:   Provider:  Laron Chavez DO    Anesthesia Type:  epidural ASA Status:  2          Anesthesia Type: epidural  Last vitals  BP   100/58 (08/23/18 0430)   Temp   99.6 °F (37.6 °C) (08/23/18 0430)   Pulse   99 (08/23/18 0430)   Resp   16 (08/23/18 0430)     SpO2         Post Anesthesia Care and Evaluation    Patient location during evaluation: bedside  Patient participation: complete - patient participated  Level of consciousness: awake and alert  Pain management: adequate  Airway patency: patent  Anesthetic complications: No anesthetic complications    Cardiovascular status: acceptable  Respiratory status: acceptable  Hydration status: acceptable  Post Neuraxial Block status: Motor and sensory function returned to baseline and No signs or symptoms of PDPH

## 2018-08-23 NOTE — PLAN OF CARE
Problem: Patient Care Overview  Goal: Plan of Care Review  Outcome: Ongoing (interventions implemented as appropriate)   08/23/18 9177   OTHER   Outcome Summary vitals within normal, tolerating po, light lochia, no s/s of infection     Goal: Individualization and Mutuality  Outcome: Ongoing (interventions implemented as appropriate)    Goal: Discharge Needs Assessment  Outcome: Ongoing (interventions implemented as appropriate)    Goal: Interprofessional Rounds/Family Conf  Outcome: Ongoing (interventions implemented as appropriate)

## 2018-08-23 NOTE — L&D DELIVERY NOTE
[facility]  Vaginal Delivery Note    Delivery     Delivery: Vaginal, Spontaneous Delivery     YOB: 2016    Time of Birth: 8:39 PM      Anesthesia: Epidural     Delivering clinician: Emi Zambrano    Forceps?   No   Vacuum? No    Shoulder dystocia present: No        Delivery narrative:  Uncomplicated  over a 1st degree perineal laceration.  loose nuchal cord x 2 easily reduced.  Anterior shoulder delivered with ease.  Infant vigorous at delivery so placed on maternal abdomen.  Delayed cord clamping x 1 min.  Cord doubly clamped and cut by mom.  Laceration repaired in standard fashion using 3-0 vicryl.      Infant    Findings: male  infant     Infant observations: Weight: 3365 g (7 lb 6.7 oz)   Length: 20.5  in  Observations/Comments:         Apgars:   8 @ 1 minute /      9 @ 5 minutes   Infant Name: Emory     Placenta, Cord, and Fluid    Placenta delivered  Spontaneous  at   2018  8:46 PM     Cord:    present.   Nuchal Cord?  yes; Number of nuchal loops present:  2    Cord blood obtained: Yes    Cord gases obtained:  No    Cord gas results: Venous:  No results found for: PHCVEN    Arterial:  No results found for: PHCART     Repair    Episiotomy: None    Lacerations: Yes  Laceration Information  Laceration Repaired?   Perineal: 1st  Yes    Periurethral: bilateral  Yes    Labial:         Sulcus:         Vaginal: No       Cervical: No          Suture used for repair: 4-0 Vicryl and 3-0 Vicryl   Estimated Blood Loss: Est. Blood Loss (mL): 200 mL (Filed from Delivery Summary) (18)           Complications  none    Disposition  Mother to Mother Baby/Postpartum  in stable condition currently.  Baby to remains with mom  in stable condition currently.      Emi Zambrano MD  18  9:55 PM

## 2018-08-23 NOTE — PROGRESS NOTES
Called due to temp of 102.  Will presumptively treat for chorioamnionitis w/ ampicillin 2g and will have pharmacy dose gentamycin.  FHT with moderate variability, accels, and rare variable decels.  Cat 2, overall very reassuring.  Continue close monitoring.    Emi Zambrano MD  8:02 PM

## 2018-08-24 VITALS
HEART RATE: 77 BPM | TEMPERATURE: 98.1 F | HEIGHT: 64 IN | RESPIRATION RATE: 14 BRPM | DIASTOLIC BLOOD PRESSURE: 66 MMHG | SYSTOLIC BLOOD PRESSURE: 118 MMHG | BODY MASS INDEX: 27.49 KG/M2 | WEIGHT: 161 LBS

## 2018-08-24 PROBLEM — Z34.90 TERM PREGNANCY: Status: RESOLVED | Noted: 2018-08-21 | Resolved: 2018-08-24

## 2018-08-24 RX ORDER — IBUPROFEN 600 MG/1
600 TABLET ORAL EVERY 6 HOURS PRN
Qty: 30 TABLET | Refills: 0 | Status: SHIPPED | OUTPATIENT
Start: 2018-08-24 | End: 2021-08-18

## 2018-08-24 RX ORDER — PSEUDOEPHEDRINE HCL 30 MG
100 TABLET ORAL 2 TIMES DAILY PRN
Qty: 60 CAPSULE | Refills: 2 | Status: SHIPPED | OUTPATIENT
Start: 2018-08-24 | End: 2021-08-18

## 2018-08-24 RX ADMIN — PRENATAL VIT W/ FE FUMARATE-FA TAB 27-0.8 MG 1 TABLET: 27-0.8 TAB at 09:07

## 2018-08-24 RX ADMIN — WITCH HAZEL 1 PAD: 500 SOLUTION RECTAL; TOPICAL at 17:10

## 2018-08-24 RX ADMIN — IBUPROFEN 600 MG: 600 TABLET ORAL at 15:35

## 2018-08-24 RX ADMIN — WITCH HAZEL 1 PAD: 500 SOLUTION RECTAL; TOPICAL at 15:26

## 2018-08-24 RX ADMIN — IBUPROFEN 600 MG: 600 TABLET ORAL at 09:07

## 2018-08-24 RX ADMIN — MEASLES, MUMPS, AND RUBELLA VIRUS VACCINE LIVE 0.5 ML: 1000; 12500; 1000 INJECTION, POWDER, LYOPHILIZED, FOR SUSPENSION SUBCUTANEOUS at 09:07

## 2018-08-24 RX ADMIN — DOCUSATE SODIUM 100 MG: 100 CAPSULE, LIQUID FILLED ORAL at 09:07

## 2018-08-24 NOTE — LACTATION NOTE
08/24/18 1115   Maternal Information   Date of Referral 08/24/18   Person Making Referral (fu consult)   Maternal Reason for Referral (latch uncomfortable)   Maternal Assessment   Breast Size Issue none   Breast Shape Bilateral:;round   Breast Density Bilateral:;soft   Nipples Bilateral:;everted   Left Nipple Symptoms redness;tender   Right Nipple Symptoms redness;tender   Reproductive Interventions   Breastfeeding Support diary/feeding log utilized;encouragement provided;infant-mother separation minimized;lactation counseling provided   Mom states nipples are sore at beginning of feedings and they hurt after feeding. Encouraged to call lactation to help with feeding before discharge home. Discussed pumping after feedings, if nipples don't improve. Teaching done, as documented under Education. Will call for help with feeding before dc home.

## 2018-08-24 NOTE — PROGRESS NOTES
8/24/2018  PPD #2    Subjective   Lara feels well.  Patient describes her lochia as less than menses.  Pain is well controlled       Objective   Temp: Temp:  [97.4 °F (36.3 °C)-98 °F (36.7 °C)] 98 °F (36.7 °C) Temp src: Oral   BP: BP: (108-118)/(51-72) 118/72        Pulse: Heart Rate:  [66-72] 72  RR: Resp:  [16] 16    General:  No acute distress   Abdomen: Fundus firm and beneath umbilicus   Pelvis: deferred     Lab Results   Component Value Date    WBC 10.75 08/21/2018    HGB 11.3 (L) 08/23/2018    HCT 33.3 (L) 08/23/2018    MCV 88.6 08/21/2018     08/21/2018    HEPBSAG Non-Reactive 03/09/2018       Assessment  1. PPD# 2 after vaginal delivery    Plan  1. Discharge to home  2. Follow up with [facility]  in 6 weeks  3. Advised no tampons, intercourse, or tub baths for 6 weeks.       This note has been electronically signed.    Emi Zambrano MD  7:53 AM  August 24, 2018

## 2018-08-24 NOTE — PLAN OF CARE
Problem: Patient Care Overview  Goal: Plan of Care Review  Outcome: Ongoing (interventions implemented as appropriate)   08/24/18 0604   Coping/Psychosocial   Plan of Care Reviewed With patient;mother   Plan of Care Review   Progress improving   OTHER   Outcome Summary VSS. Fundus and lochia WDL. Pain controlled. Breastfeeding well     Goal: Individualization and Mutuality  Outcome: Ongoing (interventions implemented as appropriate)    Goal: Discharge Needs Assessment  Outcome: Ongoing (interventions implemented as appropriate)   08/24/18 0604   Discharge Needs Assessment   Concerns to be Addressed no discharge needs identified     Goal: Interprofessional Rounds/Family Conf  Outcome: Ongoing (interventions implemented as appropriate)   08/24/18 0604   Interdisciplinary Rounds/Family Conf   Participants nursing;patient;family;physician       Problem: Breastfeeding (Adult,Obstetrics,Pediatric)  Goal: Signs and Symptoms of Listed Potential Problems Will be Absent, Minimized or Managed (Breastfeeding)  Outcome: Ongoing (interventions implemented as appropriate)   08/24/18 0604   Goal/Outcome Evaluation   Problems Assessed (Breastfeeding) all   Problems Present (Breastfeeding) none       Problem: Postpartum (Vaginal Delivery) (Adult,Obstetrics,Pediatric)  Goal: Signs and Symptoms of Listed Potential Problems Will be Absent, Minimized or Managed (Postpartum)  Outcome: Ongoing (interventions implemented as appropriate)   08/24/18 0604   Goal/Outcome Evaluation   Problems Assessed (Postpartum Vaginal Delivery) all   Problems Present (Postpartum Vag Deliv) none

## 2018-08-24 NOTE — DISCHARGE SUMMARY
[facility]  Vaginal Delivery Discharge Summary      Patient: Lara Mclaughlin      MR#:[id number]  Admission  Diagnosis: term pregnancy  Discharge Diagnosis: Same    Date of Admission: 8/21/2018  Date of Discharge:  8/24/2018    Procedures:  Vaginal, Spontaneous Delivery     8/22/2018    8:39 PM      Service:  Obstetrics    Hospital Course:  Patient underwent vaginal delivery and remained in the hospital for 3 days.  During that time she remained afebrile and hemodynamically stable.  On the day of discharge, she was eating, ambulating and voiding without difficulty.      Lab Results   Component Value Date    WBC 10.75 08/21/2018    HGB 11.3 (L) 08/23/2018    HCT 33.3 (L) 08/23/2018    MCV 88.6 08/21/2018     08/21/2018       Results from last 7 days  Lab Units 08/21/18  4892   ABO TYPING  O   RH TYPING  Positive   ANTIBODY SCREEN  Negative       Discharge Medications     Discharge Medications      New Medications      Instructions Start Date   docusate sodium 100 MG capsule   100 mg, Oral, 2 Times Daily PRN      ibuprofen 600 MG tablet  Commonly known as:  ADVIL,MOTRIN   600 mg, Oral, Every 6 Hours PRN         Continue These Medications      Instructions Start Date   Prenatal 27-1 27-1 MG tablet tablet   1 tablet, Oral, Daily             Discharge Disposition:  To Home    Discharge Condition:  Stable    Discharge Diet: regular    Activity at Discharge: Pelvic rest    Follow-up Appointments  6 weeks      Emi Zambrano MD  08/24/18  7:54 AM

## 2018-08-24 NOTE — PLAN OF CARE
Problem: Patient Care Overview  Goal: Plan of Care Review  Outcome: Outcome(s) achieved Date Met: 08/24/18    Goal: Individualization and Mutuality  Outcome: Outcome(s) achieved Date Met: 08/24/18    Goal: Discharge Needs Assessment  Outcome: Outcome(s) achieved Date Met: 08/24/18    Goal: Interprofessional Rounds/Family Conf  Outcome: Outcome(s) achieved Date Met: 08/24/18      Problem: Breastfeeding (Adult,Obstetrics,Pediatric)  Goal: Signs and Symptoms of Listed Potential Problems Will be Absent, Minimized or Managed (Breastfeeding)  Outcome: Outcome(s) achieved Date Met: 08/24/18      Problem: Postpartum (Vaginal Delivery) (Adult,Obstetrics,Pediatric)  Goal: Signs and Symptoms of Listed Potential Problems Will be Absent, Minimized or Managed (Postpartum)  Outcome: Outcome(s) achieved Date Met: 08/24/18

## 2018-08-30 LAB
CYTO UR: NORMAL
LAB AP CASE REPORT: NORMAL
LAB AP CLINICAL INFORMATION: NORMAL
LAB AP DIAGNOSIS COMMENT: NORMAL
PATH REPORT.FINAL DX SPEC: NORMAL
PATH REPORT.GROSS SPEC: NORMAL

## 2020-12-27 ENCOUNTER — APPOINTMENT (OUTPATIENT)
Dept: PREADMISSION TESTING | Facility: HOSPITAL | Age: 25
End: 2020-12-27

## 2021-08-18 ENCOUNTER — OFFICE VISIT (OUTPATIENT)
Dept: INTERNAL MEDICINE | Facility: CLINIC | Age: 26
End: 2021-08-18

## 2021-08-18 VITALS
DIASTOLIC BLOOD PRESSURE: 84 MMHG | RESPIRATION RATE: 12 BRPM | HEIGHT: 64 IN | OXYGEN SATURATION: 99 % | SYSTOLIC BLOOD PRESSURE: 120 MMHG | BODY MASS INDEX: 25.61 KG/M2 | HEART RATE: 67 BPM | WEIGHT: 150 LBS

## 2021-08-18 DIAGNOSIS — Z76.89 ENCOUNTER TO ESTABLISH CARE: ICD-10-CM

## 2021-08-18 DIAGNOSIS — F90.2 ATTENTION DEFICIT HYPERACTIVITY DISORDER (ADHD), COMBINED TYPE: Primary | ICD-10-CM

## 2021-08-18 PROCEDURE — 99203 OFFICE O/P NEW LOW 30 MIN: CPT | Performed by: NURSE PRACTITIONER

## 2021-08-18 RX ORDER — ATOMOXETINE 40 MG/1
40 CAPSULE ORAL DAILY
Qty: 30 CAPSULE | Refills: 0 | Status: SHIPPED | OUTPATIENT
Start: 2021-08-18

## 2023-09-07 ENCOUNTER — LAB (OUTPATIENT)
Dept: LAB | Facility: HOSPITAL | Age: 28
End: 2023-09-07
Payer: COMMERCIAL

## 2023-09-07 ENCOUNTER — TRANSCRIBE ORDERS (OUTPATIENT)
Dept: LAB | Facility: HOSPITAL | Age: 28
End: 2023-09-07
Payer: COMMERCIAL

## 2023-09-07 DIAGNOSIS — R30.0 DYSURIA: Primary | ICD-10-CM

## 2023-09-07 DIAGNOSIS — R30.0 DYSURIA: ICD-10-CM

## 2023-09-07 PROCEDURE — 87086 URINE CULTURE/COLONY COUNT: CPT

## 2023-09-09 LAB — BACTERIA SPEC AEROBE CULT: NORMAL

## 2023-09-14 ENCOUNTER — TRANSCRIBE ORDERS (OUTPATIENT)
Dept: LAB | Facility: HOSPITAL | Age: 28
End: 2023-09-14
Payer: COMMERCIAL

## 2023-09-14 ENCOUNTER — LAB (OUTPATIENT)
Dept: LAB | Facility: HOSPITAL | Age: 28
End: 2023-09-14
Payer: COMMERCIAL

## 2023-09-14 DIAGNOSIS — Z20.2 VENEREAL DISEASE CONTACT: ICD-10-CM

## 2023-09-14 DIAGNOSIS — Z20.2 VENEREAL DISEASE CONTACT: Primary | ICD-10-CM

## 2023-09-14 PROCEDURE — G0432 EIA HIV-1/HIV-2 SCREEN: HCPCS

## 2023-09-14 PROCEDURE — 87340 HEPATITIS B SURFACE AG IA: CPT

## 2023-09-14 PROCEDURE — 86803 HEPATITIS C AB TEST: CPT

## 2023-09-14 PROCEDURE — 86592 SYPHILIS TEST NON-TREP QUAL: CPT

## 2023-09-14 PROCEDURE — 36415 COLL VENOUS BLD VENIPUNCTURE: CPT

## 2023-09-15 LAB
HBV SURFACE AG SERPL QL IA: NORMAL
HCV AB SER DONR QL: NORMAL
HIV 1+2 AB+HIV1 P24 AG SERPL QL IA: NORMAL
RPR SER QL: NORMAL

## 2025-04-24 ENCOUNTER — OFFICE VISIT (OUTPATIENT)
Dept: INTERNAL MEDICINE | Facility: CLINIC | Age: 30
End: 2025-04-24
Payer: OTHER GOVERNMENT

## 2025-04-24 VITALS
DIASTOLIC BLOOD PRESSURE: 70 MMHG | HEART RATE: 82 BPM | OXYGEN SATURATION: 98 % | WEIGHT: 145 LBS | TEMPERATURE: 96.8 F | HEIGHT: 64 IN | SYSTOLIC BLOOD PRESSURE: 102 MMHG | RESPIRATION RATE: 14 BRPM | BODY MASS INDEX: 24.75 KG/M2

## 2025-04-24 DIAGNOSIS — Z76.89 ENCOUNTER TO ESTABLISH CARE: Primary | ICD-10-CM

## 2025-04-24 DIAGNOSIS — Z00.00 HEALTHCARE MAINTENANCE: ICD-10-CM

## 2025-04-24 DIAGNOSIS — F90.9 ATTENTION DEFICIT HYPERACTIVITY DISORDER (ADHD), UNSPECIFIED ADHD TYPE: ICD-10-CM

## 2025-04-24 PROBLEM — F90.8 OTHER SPECIFIED ATTENTION DEFICIT HYPERACTIVITY DISORDER (ADHD): Status: ACTIVE | Noted: 2025-04-24

## 2025-04-24 NOTE — PROGRESS NOTES
New Patient Office Visit      Date: 2025  Patient Name: Lara Mclaughlin  : 1995   MRN: [id number]     Chief Complaint:    Chief Complaint   Patient presents with    Establish Care    ADHD       History of Present Illness: Lara Mclaughlin is a 29 y.o. female who is here today to establish care.  Past medical, surgical, family and social history as outlined below.     Concerned today for ADHD. Was working from home and was able to manage sx but is now moving back into office and is concerned.  Has tried atomoxetine- made her emotional and felt that it changed her personality. Also tried Wellbutrin and did not like the way it made her feel.     Seeing OBGYN today to update PAP smear.    No further concerns today.     Subjective      Review of Systems:   Review of Systems   Psychiatric/Behavioral:  Positive for decreased concentration.        Past Medical History:   Past Medical History:   Diagnosis Date    ADHD (attention deficit hyperactivity disorder)     Allergic        Past Surgical History:   Past Surgical History:   Procedure Laterality Date    TONSILLECTOMY      WISDOM TOOTH EXTRACTION         Family History:   Family History   Problem Relation Age of Onset    Cancer Mother         skin    Cancer Maternal Grandmother 70 - 79        lung       Social History:   Social History     Socioeconomic History    Marital status: Single   Tobacco Use    Smoking status: Never    Smokeless tobacco: Never   Vaping Use    Vaping status: Never Used   Substance and Sexual Activity    Alcohol use: Yes     Alcohol/week: 3.0 standard drinks of alcohol     Types: 3 Glasses of wine per week     Comment: Socially    Drug use: No    Sexual activity: Yes     Partners: Male     Birth control/protection: I.U.D.       Medications:   No current outpatient medications on file.    Allergies:   Allergies   Allergen Reactions    Etonogestrel-Ethinyl Estradiol Nausea And Vomiting    Lactose Unknown (See Comments)  "      Objective     Physical Exam:  Vital Signs:   Vitals:    04/24/25 0825   BP: 102/70   Pulse: 82   Resp: 14   Temp: 96.8 °F (36 °C)   TempSrc: Temporal   SpO2: 98%   Weight: 65.8 kg (145 lb)   Height: 162.6 cm (64.02\")   PainSc: 0-No pain     Body mass index is 24.88 kg/m².   Facility age limit for growth %rosenda is 20 years.  BMI is within normal parameters. No other follow-up for BMI required.       Physical Exam  Vitals and nursing note reviewed.   Constitutional:       General: She is not in acute distress.     Appearance: Normal appearance.   Eyes:      Extraocular Movements: Extraocular movements intact.      Conjunctiva/sclera: Conjunctivae normal.   Cardiovascular:      Rate and Rhythm: Normal rate and regular rhythm.      Pulses: Normal pulses.      Heart sounds: Normal heart sounds.   Pulmonary:      Effort: Pulmonary effort is normal. No respiratory distress.   Neurological:      General: No focal deficit present.      Mental Status: She is alert and oriented to person, place, and time. Mental status is at baseline.   Psychiatric:         Mood and Affect: Mood normal.         Behavior: Behavior normal.           Assessment / Plan      Assessment/Plan:      Encounter to establish care  - Records reviewed  Attention deficit hyperactivity disorder (ADHD), unspecified ADHD type  - Has tried atomoxetine and Wellbutrin in the past, will refer patient to behavioral health for further evaluation. Also provided patient with contact information for ADHD testing ([facility]- [phone] and [facility] [facility])     Orders:    Ambulatory Referral to Behavioral Health    Healthcare maintenance    Orders:    CBC Auto Differential; Future    Comprehensive Metabolic Panel; Future    Hemoglobin A1c; Future    Lipid Panel; Future    TSH Rfx On Abnormal To Free T4; Future           Follow Up:   Return in about 1 month (around 5/24/2025) for Annual.      [address] [place]   "

## 2025-04-25 ENCOUNTER — PATIENT ROUNDING (BHMG ONLY) (OUTPATIENT)
Dept: INTERNAL MEDICINE | Facility: CLINIC | Age: 30
End: 2025-04-25
Payer: OTHER GOVERNMENT

## 2025-04-25 NOTE — PROGRESS NOTES
A My-chart message has been sent to the patient for [place] [facility] [place]

## 2025-05-13 ENCOUNTER — OFFICE VISIT (OUTPATIENT)
Dept: BEHAVIORAL HEALTH | Facility: CLINIC | Age: 30
End: 2025-05-13
Payer: OTHER GOVERNMENT

## 2025-05-13 VITALS
SYSTOLIC BLOOD PRESSURE: 110 MMHG | OXYGEN SATURATION: 97 % | HEART RATE: 74 BPM | BODY MASS INDEX: 24.86 KG/M2 | HEIGHT: 64 IN | DIASTOLIC BLOOD PRESSURE: 70 MMHG | WEIGHT: 145.6 LBS

## 2025-05-13 DIAGNOSIS — F90.2 ATTENTION DEFICIT HYPERACTIVITY DISORDER, COMBINED TYPE: Primary | ICD-10-CM

## 2025-05-13 DIAGNOSIS — Z13.30 ENCOUNTER FOR BEHAVIORAL HEALTH SCREENING: ICD-10-CM

## 2025-05-13 PROBLEM — F90.8 OTHER SPECIFIED ATTENTION DEFICIT HYPERACTIVITY DISORDER (ADHD): Status: RESOLVED | Noted: 2025-04-24 | Resolved: 2025-05-13

## 2025-05-13 RX ORDER — COPPER 313.4 MG/1
INTRAUTERINE DEVICE INTRAUTERINE
COMMUNITY

## 2025-05-13 RX ORDER — DEXTROAMPHETAMINE SACCHARATE, AMPHETAMINE ASPARTATE, DEXTROAMPHETAMINE SULFATE AND AMPHETAMINE SULFATE 2.5; 2.5; 2.5; 2.5 MG/1; MG/1; MG/1; MG/1
10 TABLET ORAL DAILY
Qty: 30 TABLET | Refills: 0 | Status: SHIPPED | OUTPATIENT
Start: 2025-05-13

## 2025-05-13 NOTE — PROGRESS NOTES
New Patient Office Visit      Patient Name: Lara Mclaughlin  : 1995   MRN: [id number]     Referring Provider: Charline Gomez PA-C    Chief Complaint:      ICD-10-CM ICD-9-CM   1. Attention deficit hyperactivity disorder, combined type  F90.2 314.01   2. Encounter for behavioral health screening  Z13.30 V82.89        History of Present Illness:   Lara Mclaughlin is a 29 y.o. female who is here today for evaluation and medication management.    History of Present Illness  The patient presents for evaluation of ADHD.    She was previously diagnosed with ADHD in  by her primary care provider and was prescribed Strattera. However, she discontinued the medication due to increased irritability and found it easier to manage her symptoms while working remotely. Recently, she transitioned to an in-office job, which has been challenging due to the increased distractions. Her general provider recommended a re-evaluation of her ADHD diagnosis, but this has not yet been completed. She reports difficulty staying on task at work, often juggling multiple assignments simultaneously. She also experiences impulsivity, such as deciding to travel to [place] on short notice, which has occasionally led to relationship and financial issues. She frequently misplaces items, which negatively impacts her mental health. She reports no obsessive thoughts or actions.    Social History:  - Lives with her 6-year-old son  - Strong support system from parents and sisters  - Enjoys staying active through sports    Psychiatric History:  - No other mental health diagnoses, including anxiety or depression  - Engaged in talk therapy last year, found it beneficial  - No suicidal thoughts, self-harm behaviors, or psychiatric hospitalizations      Results:  - ADHD screener results indicate frequent difficulties with attention, organization, and impulsivity    She reports no significant changes in appetite or weight. She has been back  in the office for 2 weeks and reports difficulty completing paperwork. She occasionally experiences difficulty falling asleep or maintaining sleep, with at least one restless night per week. She sometimes experiences nightmares. She believes her developmental milestones were achieved on time. She graduated from high school and college, performing well academically despite the effort required to maintain focus. She finds motivation in her son and enjoys staying active through sports.    SOCIAL HISTORY  The patient drinks alcohol 2 to 3 times a week, often getting drunk on weekends. The patient does not use marijuana, illicit substances, tobacco, or nicotine. The patient occasionally drinks coffee at the office.    FAMILY HISTORY  The patient's aunt has depression and may have had a suicide attempt.       Current Treatments: see problem list  Medications: see medication  Therapy: talk therapy was helpful, but none currently    Subjective      Review of Systems:   Review of Systems   Constitutional:  Negative for appetite change and unexpected weight change.   Eyes:  Negative for visual disturbance.   Respiratory:  Negative for chest tightness and shortness of breath.    Cardiovascular:  Negative for chest pain.   Musculoskeletal:  Negative for gait problem.   Skin:  Negative for rash and wound.   Neurological:  Negative for dizziness, tremors, seizures, weakness and light-headedness.   Psychiatric/Behavioral:  Positive for decreased concentration. Negative for agitation, behavioral problems, confusion, dysphoric mood, hallucinations, self-injury, sleep disturbance and suicidal ideas. The patient is not nervous/anxious and is not hyperactive.      Sleep pattern: varies, mostly well rested, 1 bad night per week, some nightmares  Appetite: decreased appetite     Past Medical History:   Past Medical History:   Diagnosis Date    ADHD (attention deficit hyperactivity disorder)     Allergic        Past Surgical History:    Past Surgical History:   Procedure Laterality Date    TONSILLECTOMY      WISDOM TOOTH EXTRACTION         Family History:   Family History   Problem Relation Age of Onset    Cancer Mother         skin    Cancer Maternal Grandmother 70 - 79        lung         PHQ-9 Depression Screening  Little interest or pleasure in doing things? Not at all   Feeling down, depressed, or hopeless? Not at all   PHQ-2 Total Score 0   Trouble falling or staying asleep, or sleeping too much? Several days   Feeling tired or having little energy? Several days   Poor appetite or overeating? Not at all   Feeling bad about yourself - or that you are a failure or have let yourself or your family down? Not at all   Trouble concentrating on things, such as reading the newspaper or watching television? Almost all   Moving or speaking so slowly that other people could have noticed? Or the opposite - being so fidgety or restless that you have been moving around a lot more than usual? Over half   Thoughts that you would be better off dead, or of hurting yourself in some way? Not at all   PHQ-9 Total Score 7   If you checked off any problems, how difficult have these problems made it for you to do your work, take care of things at home, or get along with other people? Very difficult       ELIA-7 Anxiety Screening  Over the last two weeks, how often have you been bothered by the following problems?  Feeling nervous, anxious or on edge: Several days  Not being able to stop or control worrying: Several days  Worrying too much about different things: Several days  Trouble Relaxing: More than half the days  Being so restless that it is hard to sit still: More than half the days  Becoming easily annoyed or irritable: Several days  Feeling afraid as if something awful might happen: Not at all  ELIA 7 Total Score: 8  If you checked any problems, how difficult have these problems made it for you to do your work, take care of things at home, or get along with  other people: Somewhat difficult    Psychiatric History:     Previous medications: straterra, hydroxyzine  Inpatient admissions: denies  History of suicide/self harm attempts: denies  Trauma/Abuse History: denies  Developmental History: on target  Patient denies any history of carrie or hypomania, although has traveled impulsively. No thought disturbances or psychosis reported with no thought blocking or thought broadcasting noted. No history of eating disorders, OCD or panic. Patient denies symptoms of depression including hopelessness, helplessness, and anhedonia. Denies history of self harm or suicidal ideation. She denies any homicidal thinking. Denies history of abuse (verbal, physical or sexual) and denies any symptoms of PTSD.  No personality disorders noted at this time.    RISK ASSESSMENT:    Does patient have intent for suicide? denies  Does patient have thoughts of suicide? denies  Does patient have a current plan for suicide? denies  Access to firearms or weapons: denies  History of suicide attempts: denies  History of homicidal ideation: denies  Family history of suicide or attempts: denies  Risk Taking/Impulsive Behavior (current or past): impulsive travel Describe: None   Protective factors: son, likes job    Social History:    Highest level of education obtained: college, school always a struggle  Living situation: son 6 yr  Patient's Occupation: back in office, , 2 weeks   Leisure and Recreation:  son sports, kickball  Support system: parents and sisters  Illicit substance use: denies  Alcohol use: 2-3 times a week, on the weekends to get drunk, 5 drinks  Tobacco use: denies  Caffeine: occasional cup of coffee    Legal History:   No legal history noted today.     Medications:     Current Outpatient Medications:     amphetamine-dextroamphetamine (Adderall) 10 MG tablet, Take 1 tablet by mouth Daily., Disp: 30 tablet, Rfl: 0    Paragard Intrauterine Copper intrauterine device IUD,  "Paragard Intrauterine Copper, Disp: , Rfl:     Medication Considerations:  RICHELLE reviewed and appropriate.      Allergies:   Allergies   Allergen Reactions    Etonogestrel-Ethinyl Estradiol Nausea And Vomiting    Lactose Unknown (See Comments)          Objective     Physical Exam:  Vital Signs:   Vitals:    05/13/25 1506 05/13/25 1509 05/13/25 1511 05/13/25 1512   BP:    110/70   Pulse:   74    SpO2:   97%    Weight:  66 kg (145 lb 9.6 oz)     Height: 162.2 cm (63.86\")        Body mass index is 25.1 kg/m².     Mental Status Exam:   Hygiene:   good  Cooperation:  Cooperative  Eye Contact:  Good  Psychomotor Behavior:  Appropriate  Affect:  Appropriate  Mood: normal  Speech:  Normal  Thought Process:  Goal directed and Linear  Thought Content:  Normal  Suicidal:  None  Homicidal:  None  Hallucinations:  None  Delusion:  None  Memory:  Intact  Orientation:  Person, Place, Time, and Situation  Reliability:  good  Insight:  Good  Judgement:  Good  Impulse Control:  Good  Physical/Medical Issues:   see problem list      Assessment / Plan      Visit Diagnosis/Orders Placed This Visit:  Diagnoses and all orders for this visit:    1. Attention deficit hyperactivity disorder, combined type (Primary)  -     Ambulatory Referral to Behavioral Health  -     amphetamine-dextroamphetamine (Adderall) 10 MG tablet; Take 1 tablet by mouth Daily.  Dispense: 30 tablet; Refill: 0    2. Encounter for behavioral health screening  -     Ambulatory Referral to Behavioral Health              Functional Status: No impairment    Prognosis: Good with Ongoing Treatment     Impression/Formulation:  Patient appeared alert and oriented.  Patient is voluntarily requesting to begin outpatient psychiatric treatment at [facility]. Patient is receptive to assistance with maintaining a stable lifestyle.  Patient presents with history of     ICD-10-CM ICD-9-CM   1. Attention deficit hyperactivity disorder, combined type  F90.2 " 314.01   2. Encounter for behavioral health screening  Z13.30 V82.89   Reviewed patient's previous provider notes. Reviewed most recent labs. Patient meets DSM V diagnostic criteria for diagnoses. Diagnoses may be updated as more information becomes available.    Differential diagnoses include: MDD, ELIA    Assessment & Plan    The patient discussed her challenges with managing ADHD symptoms, particularly in an office environment with increased distractions. She reported difficulty staying on task, impulsivity, and frequent distraction. The patient also shared her history with ADHD diagnosis and treatment, including previous use of Strattera, which caused irritability. She expressed interest in therapy, preferably in-person, as telehealth was less effective for her. The patient mentioned occasional sleep disturbances and impulsive behaviors, such as spontaneous travel and social events impacting her responsibilities.    The patient presents with symptoms consistent with ADHD, including difficulty maintaining focus, impulsivity, and frequent distraction. She has a history of ADHD diagnosis and treatment with Strattera, which was discontinued due to irritability. The patient reports no other mental health diagnoses, such as anxiety or depression. Her sleep is occasionally disrupted, and she experiences impulsive behaviors that affect her daily functioning. The patient appears motivated to manage her symptoms and is open to therapeutic interventions.    The patient was informed about the potential side effects of stimulant medications and the importance of adhering to the prescribed regimen. A controlled substance agreement was discussed and will be signed. The patient was advised to monitor her alcohol intake and avoid drinking while on stimulant medication.    Plan:  - Start Adderall 10 mg, to be taken in the morning.  - Monitor for side effects such as increased irritability, agitation, heart racing, and sleep  disturbances.  - Abstain from alcohol while on medication.  - Sign a controlled substance agreement.  - Conduct a urine drug screen during the follow-up visit.  - Discontinue medication and inform the clinic immediately if significant side effects occur.  - Contact the crisis line at [phone] or seek immediate help if suicidal thoughts occur.    Follow-up:  - Schedule a follow-up visit in 1 month to assess the response to medication and adjust the treatment plan as necessary.      Patient will continue supportive psychotherapy efforts and medications as indicated. Clinic will obtain release of information for current treatment team for continuity of care as needed. Patient will contact this office, call [phone] or present to the nearest emergency room should suicidal or homicidal ideations occur. Discussed medication options and treatment plan of prescribed medication(s) as well as the risks, benefits, and potential side effects. Patient acknowledged and verbally consented to continue with current treatment plan and was educated on the importance of compliance with treatment and follow-up appointments.     Patient instructions:   Reviewed patient's RICHELLE. Discussed the risks including side effects, benefits and alternatives of stimulant medication use. We talked about the fact that these medications are schedule II controlled substances as well as the risks for potential addiction. We also discussed how this medication will not be refilled early and the medication should not be shared or taken in any manner outside of as prescribed. We discussed the potential for decreased appetite, weight loss and cardiac effects as well. Also, advised patient to watch for heart racing, palpitations, chest pain, high blood pressure, increased irritability or agitation related to stimulant use. This medication can be discontinued at the provider's discretion if misuse or addiction is suspected.         Patient or patient representative  verbalized consent for the use of Ambient Listening during the visit with  NADINE Alba for chart documentation. 5/14/2025  14:16 EDT      NADINE Alba, [employer]

## 2025-05-21 ENCOUNTER — TELEPHONE (OUTPATIENT)
Dept: BEHAVIORAL HEALTH | Facility: CLINIC | Age: 30
End: 2025-05-21
Payer: OTHER GOVERNMENT

## 2025-05-21 DIAGNOSIS — F90.2 ATTENTION DEFICIT HYPERACTIVITY DISORDER, COMBINED TYPE: ICD-10-CM

## 2025-05-21 NOTE — TELEPHONE ENCOUNTER
Called pt to inform her that Margaret Hernández will be out of office during the time of her next refill and to ensure we have the most up to date pharmacy so medication can be filled.    No answer LVM to return call to office

## 2025-05-27 ENCOUNTER — OFFICE VISIT (OUTPATIENT)
Dept: INTERNAL MEDICINE | Facility: CLINIC | Age: 30
End: 2025-05-27
Payer: OTHER GOVERNMENT

## 2025-05-27 VITALS
OXYGEN SATURATION: 99 % | SYSTOLIC BLOOD PRESSURE: 114 MMHG | HEART RATE: 62 BPM | HEIGHT: 64 IN | WEIGHT: 143 LBS | DIASTOLIC BLOOD PRESSURE: 70 MMHG | BODY MASS INDEX: 24.41 KG/M2 | RESPIRATION RATE: 14 BRPM

## 2025-05-27 DIAGNOSIS — Z00.00 ANNUAL PHYSICAL EXAM: Primary | ICD-10-CM

## 2025-05-27 DIAGNOSIS — F90.2 ATTENTION DEFICIT HYPERACTIVITY DISORDER, COMBINED TYPE: ICD-10-CM

## 2025-05-27 PROCEDURE — 99395 PREV VISIT EST AGE 18-39: CPT

## 2025-05-27 NOTE — PROGRESS NOTES
Female Physical Note      Date: 2025   Patient Name: Lara Mclaughlin  : 1995   MRN: [id number]     Chief Complaint:    Chief Complaint   Patient presents with    Annual Exam       History of Present Illness: Lara Mclaughlin is a 29 y.o. female who is here today for their annual health maintenance and physical. Since last visit, patient established care with NADINE Alba and was started on Adderall 10 mg daily. Pt reports significant improvement since. She has no further complaints today.       Subjective      Review of Systems:   Review of Systems   Constitutional:  Negative for chills and fever.   HENT:  Negative for congestion and rhinorrhea.    Respiratory:  Negative for shortness of breath.    Cardiovascular:  Negative for chest pain.   Gastrointestinal:  Negative for abdominal pain, constipation and diarrhea.   Endocrine: Negative for polydipsia and polyuria.   Genitourinary:  Negative for dysuria.   Musculoskeletal:  Negative for myalgias.   Skin:  Negative for skin lesions.   Allergic/Immunologic: Negative for environmental allergies.   Neurological:  Negative for dizziness and headache.   Psychiatric/Behavioral:  Negative for suicidal ideas.        General Health:  Reported Health: good    Social History:  Household Members: son  Marital Status: single  Work Status: employed  Occupation:  relations    General Health:  Diet: Overall balanced diet reported, staying well hydrated with water, 1-cup of coffee a day  Exercise: Compliant with routine exercise- orange theory 3 times a week    Dental: Compliant with dental screening every 6 months.  Vision: Compliant with yearly vision screenings.  Hearing Concerns: none    Tobacco use: no  Alcohol use: yes, 3 glasses of wine, 2-3 times a week  Drug use: no    Reproductive Health:  Sexually Active: no  Sexual Health Concerns: no  Contraception: IUD    Menstrual Status:   premenopausal.  Periods are regular every 25-35 days,  "lasting 5 days. Dysmenorrhea:mild, occurring first 1-2 days of flow.    Depression Screening: PHQ-2 Depression Screening  Little interest or pleasure in doing things? Not at all   Feeling down, depressed, or hopeless? Not at all   PHQ-2 Total Score 0        Allergies:  Allergies   Allergen Reactions    Etonogestrel-Ethinyl Estradiol Nausea And Vomiting    Lactose Unknown (See Comments)     GI issue        Medications:    Current Outpatient Medications:     amphetamine-dextroamphetamine (Adderall) 10 MG tablet, Take 1 tablet by mouth Daily., Disp: 30 tablet, Rfl: 0    Paragard Intrauterine Copper intrauterine device IUD, Paragard Intrauterine Copper, Disp: , Rfl:      The following portions of the patient's chart were reviewed in this encounter and updated as appropriate: Past Medical History, Surgical History, Family History, and Social History      Health Maintenance:  Colonoscopy: n/a  Mammogram: n/a  Pap: 04/2025  DEXA: n/a    Lung Cancer Screening: N/A nonsmoker    CV Screening:  Lipids: No results found for: \"CHOL\", \"CHLPL\", \"TRIG\", \"HDL\", \"LDL\", \"LDLDIRECT\"    A1C: No results found for: \"HGBA1C\"    Objective     Vitals:    05/27/25 1610   BP: 114/70   Pulse: 62   Resp: 14   SpO2: 99%   Weight: 64.9 kg (143 lb)   Height: 162.2 cm (63.86\")   PainSc: 0-No pain     BMI is within normal parameters. No other follow-up for BMI required.       Physical Exam  Vitals and nursing note reviewed.   Constitutional:       General: She is not in acute distress.     Appearance: Normal appearance.   HENT:      Head: Normocephalic and atraumatic.      Right Ear: Tympanic membrane, ear canal and external ear normal.      Left Ear: Tympanic membrane, ear canal and external ear normal.      Mouth/Throat:      Pharynx: Oropharynx is clear.   Eyes:      Conjunctiva/sclera: Conjunctivae normal.      Pupils: Pupils are equal, round, and reactive to light.   Neck:      Thyroid: No thyroid mass, thyromegaly or thyroid tenderness. "   Cardiovascular:      Rate and Rhythm: Normal rate and regular rhythm.      Pulses: Normal pulses.      Heart sounds: Normal heart sounds. No murmur heard.  Pulmonary:      Effort: Pulmonary effort is normal. No respiratory distress.      Breath sounds: Normal breath sounds. No wheezing.   Abdominal:      General: Bowel sounds are normal.      Palpations: Abdomen is soft.      Tenderness: There is no abdominal tenderness.   Musculoskeletal:         General: No swelling. Normal range of motion.      Cervical back: Normal range of motion and neck supple.   Skin:     General: Skin is warm and dry.   Neurological:      General: No focal deficit present.      Mental Status: She is alert and oriented to person, place, and time.   Psychiatric:         Mood and Affect: Mood normal.         Behavior: Behavior normal.           Assessment / Plan         Annual physical exam  - Counseled patient regarding multimodal approach with healthy nutrition, healthy sleep, routine physical activity, counseling, safety measures and medications.  Attention deficit hyperactivity disorder, combined type  - Continue f/u with NADINE Alba         Follow up: Return in about 1 year (around 5/27/2026) for Annual.    [facility] [place]

## 2025-06-16 ENCOUNTER — OFFICE VISIT (OUTPATIENT)
Dept: BEHAVIORAL HEALTH | Facility: CLINIC | Age: 30
End: 2025-06-16
Payer: OTHER GOVERNMENT

## 2025-06-16 VITALS
OXYGEN SATURATION: 96 % | WEIGHT: 144.4 LBS | DIASTOLIC BLOOD PRESSURE: 72 MMHG | HEIGHT: 64 IN | BODY MASS INDEX: 24.65 KG/M2 | SYSTOLIC BLOOD PRESSURE: 116 MMHG | HEART RATE: 67 BPM

## 2025-06-16 DIAGNOSIS — Z13.30 ENCOUNTER FOR BEHAVIORAL HEALTH SCREENING: ICD-10-CM

## 2025-06-16 DIAGNOSIS — Z79.899 LONG-TERM USE OF HIGH-RISK MEDICATION: ICD-10-CM

## 2025-06-16 DIAGNOSIS — F90.2 ATTENTION DEFICIT HYPERACTIVITY DISORDER, COMBINED TYPE: Primary | ICD-10-CM

## 2025-06-16 PROCEDURE — 99214 OFFICE O/P EST MOD 30 MIN: CPT | Performed by: REGISTERED NURSE

## 2025-06-16 PROCEDURE — 96127 BRIEF EMOTIONAL/BEHAV ASSMT: CPT | Performed by: REGISTERED NURSE

## 2025-06-16 RX ORDER — DEXTROAMPHETAMINE SACCHARATE, AMPHETAMINE ASPARTATE, DEXTROAMPHETAMINE SULFATE AND AMPHETAMINE SULFATE 2.5; 2.5; 2.5; 2.5 MG/1; MG/1; MG/1; MG/1
TABLET ORAL
Qty: 45 TABLET | Refills: 0 | Status: SHIPPED | OUTPATIENT
Start: 2025-06-16

## 2025-06-16 NOTE — PROGRESS NOTES
Follow Up Office Visit      Patient Name: Lara Mclaughlin  : 1995   MRN: [id number]     Referring Provider: Charline Gomez PA-C    Chief Complaint:      ICD-10-CM ICD-9-CM   1. Attention deficit hyperactivity disorder, combined type  F90.2 314.01   2. Long-term use of high-risk medication  Z79.899 V58.69   3. Encounter for behavioral health screening  Z13.30 V82.89        History of Present Illness:   Lara Mclaughlin is a 29 y.o. female who is here today for follow up and medication management    Subjective      Patient Reports:   History of Present Illness  The patient presents for evaluation of ADHD.    She reports a positive response to Adderall, with no adverse effects such as tachycardia, irritability, or agitation. The medication has enhanced her focus and task management at work. She inadvertently missed her dose this morning and noticed a significant decrease in her ability to concentrate on her tasks. Her sleep pattern remains unchanged, with persistent difficulty in achieving restful sleep. She does not perceive any alteration in her energy levels due to the medication. She has observed a slight decrease in her appetite, but she continues to eat regularly. The effectiveness of the medication appears to diminish by the afternoon, around 2 PM. She expresses a desire for the medication's effects to persist until the end of her workday. She occasionally forgets to take the medication and intentionally skips it during weekends. She typically retires to bed at 10 PM.    - Works during the weekdays  - Occasionally skips medication on weekends    Review of Systems:   Review of Systems   Constitutional:  Negative for appetite change and unexpected weight change.   Eyes:  Negative for visual disturbance.   Respiratory:  Negative for chest tightness and shortness of breath.    Cardiovascular:  Negative for chest pain.   Musculoskeletal:  Negative for gait problem.   Skin:  Negative for rash and  wound.   Neurological:  Negative for dizziness, tremors, seizures, weakness and light-headedness.   Psychiatric/Behavioral:  Positive for decreased concentration and sleep disturbance. Negative for agitation, behavioral problems, confusion, dysphoric mood, hallucinations, self-injury and suicidal ideas. The patient is not nervous/anxious and is not hyperactive.      Sleep pattern: varies, still not well rested  Appetite: somewhat decreased appetite     PHQ-9 Depression Screening  Little interest or pleasure in doing things? Several days   Feeling down, depressed, or hopeless? Several days   PHQ-2 Total Score 2   Trouble falling or staying asleep, or sleeping too much? Several days   Feeling tired or having little energy? Several days   Poor appetite or overeating? Several days   Feeling bad about yourself - or that you are a failure or have let yourself or your family down? Several days   Trouble concentrating on things, such as reading the newspaper or watching television? Over half   Moving or speaking so slowly that other people could have noticed? Or the opposite - being so fidgety or restless that you have been moving around a lot more than usual? Several days   Thoughts that you would be better off dead, or of hurting yourself in some way? Not at all   PHQ-9 Total Score 9   If you checked off any problems, how difficult have these problems made it for you to do your work, take care of things at home, or get along with other people? Somewhat difficult       ELIA-7 Anxiety Screening  Over the last two weeks, how often have you been bothered by the following problems?  Feeling nervous, anxious or on edge: Several days  Not being able to stop or control worrying: Several days  Worrying too much about different things: Several days  Trouble Relaxing: Several days  Being so restless that it is hard to sit still: Several days  Becoming easily annoyed or irritable: Several days  Feeling afraid as if something awful  "might happen: Several days  ELIA 7 Total Score: 7  If you checked any problems, how difficult have these problems made it for you to do your work, take care of things at home, or get along with other people: Somewhat difficult    RISK ASSESSMENT:  Patient denies any thoughts or intent of suicide today. Patient denies any impulsive behavior today.     Medications:     Current Outpatient Medications:     amphetamine-dextroamphetamine (Adderall) 10 MG tablet, Take one tablet by mouth every morning and take 1/2 tablet by mouth every afternoon by 1 pm, Disp: 45 tablet, Rfl: 0    Paragard Intrauterine Copper intrauterine device IUD, Paragard Intrauterine Copper, Disp: , Rfl:     Medication Considerations:  RICHELLE reviewed and appropriate.      Allergies:   Allergies   Allergen Reactions    Etonogestrel-Ethinyl Estradiol Nausea And Vomiting    Lactose Unknown (See Comments)     GI issue         Objective     Physical Exam:  Vital Signs:   Vitals:    06/16/25 1604 06/16/25 1606   BP:  116/72   Pulse:  67   SpO2:  96%   Weight: 65.5 kg (144 lb 6.4 oz)    Height: 162.2 cm (63.86\")      Body mass index is 24.89 kg/m².     Mental Status Exam:   Hygiene:   good   Cooperation:  Cooperative  Eye Contact:  Good  Psychomotor Behavior:  Appropriate  Affect:  Appropriate  Mood: normal  Speech:  Normal  Thought Process:  Goal directed and Linear  Thought Content:  Normal  Suicidal:  None  Homicidal:  None  Hallucinations:  None  Delusion:  None  Memory:  Intact  Orientation:  Person, Place, Time, and Situation  Reliability:  good  Insight:  Good  Judgement:  Good  Impulse Control:  Good  Physical/Medical Issues:  see problem list     Assessment / Plan      Visit Diagnosis/Orders Placed This Visit:  Diagnoses and all orders for this visit:    1. Attention deficit hyperactivity disorder, combined type (Primary)  -     amphetamine-dextroamphetamine (Adderall) 10 MG tablet; Take one tablet by mouth every morning and take 1/2 tablet by mouth " every afternoon by 1 pm  Dispense: 45 tablet; Refill: 0    2. Long-term use of high-risk medication  -     Compliance Drug Analysis, Ur - Urine, Clean Catch; Future  -     Compliance Drug Analysis, Ur - Urine, Clean Catch    3. Encounter for behavioral health screening         Functional Status: No impairment    Prognosis: Good with Ongoing Treatment     Impression/Formulation:  Patient appeared alert and oriented.  Patient is voluntarily requesting to continue outpatient psychiatric treatment at [facility].  Patient is receptive to assistance with maintaining a stable lifestyle.  Patient presents with history of     ICD-10-CM ICD-9-CM   1. Attention deficit hyperactivity disorder, combined type  F90.2 314.01   2. Long-term use of high-risk medication  Z79.899 V58.69   3. Encounter for behavioral health screening  Z13.30 V82.89     Reviewed patient's previous provider notes. Reviewed most recent labs. Patient meets DSM V diagnostic criteria for diagnoses. Diagnoses may be updated as more information becomes available.     Assessment & Plan  Problems:  - Attention deficit hyperactivity disorder (ADHD)    Content of Therapy:  - Discussed the effectiveness of Adderall in improving focus at work.  - Addressed the absence of negative side effects such as heart racing, irritability, or agitation.  - Explored the impact of Adderall on sleep and appetite.  - Considered options for medication adjustments, including extended-release formulations and split dosing.  - Emphasized the importance of taking medication with food to avoid decreased appetite.    The patient reports significant improvement in focus at work with the use of Adderall, without experiencing any negative side effects. She notes that the medication's effectiveness diminishes by the afternoon, impacting her ability to stay on task for the entire workday. There have been no changes in sleep patterns or energy levels, and she continues to  experience usual sleep difficulties. The patient is aware of the need to take the medication with food to maintain appetite.    Discussed the current dosage of Adderall and considered options for extended-release formulations or split dosing to maintain effectiveness throughout the workday.  Provided information on the importance of taking medication with food and the potential impact on sleep if taken too late in the day.    Plan:  - Continue Adderall 10 mg, with instructions to take one tablet in the morning and half a tablet at midday.  - Collect a urine drug sample today.  - Monitor for any intolerable side effects and discontinue the second dose if necessary.  - Seek emergency medical attention if experiencing suicidal ideation.    Follow-up:  - Follow-up appointment scheduled in 1 month to assess the effectiveness of the adjusted dosing regimen and overall progress.      Patient will continue supportive psychotherapy efforts and medications as indicated. Clinic will obtain release of information for current treatment team for continuity of care as needed. Patient will contact this office, call [phone] or present to the nearest emergency room should suicidal or homicidal ideations occur.  Discussed medication options and treatment plan of prescribed medication(s) as well as the risks, benefits, and potential side effects. Patient acknowledged and verbally consented to continue with current treatment plan and was educated on the importance of compliance with treatment and follow-up appointments.     Patient instructions:  Reviewed patient's RICHELLE. Discussed the risks including side effects, benefits and alternatives of stimulant medication use. We talked about the fact that these medications are schedule II controlled substances as well as the risks for potential addiction. We also discussed how this medication will not be refilled early and the medication should not be shared or taken in any manner outside of as  prescribed. We discussed the potential for decreased appetite, weight loss and cardiac effects as well. Also, advised patient to watch for heart racing, palpitations, chest pain, high blood pressure, increased irritability or agitation related to stimulant use. This medication can be discontinued at the provider's discretion if misuse or addiction is suspected.         Patient or patient representative verbalized consent for the use of Ambient Listening during the visit with  NADINE Alba for chart documentation. 6/16/2025  16:17 EDT    NADINE Alba, PMHNP-BC [facility]

## 2025-06-22 LAB — DRUGS UR: NORMAL

## 2025-07-15 ENCOUNTER — OFFICE VISIT (OUTPATIENT)
Dept: BEHAVIORAL HEALTH | Facility: CLINIC | Age: 30
End: 2025-07-15
Payer: COMMERCIAL

## 2025-07-15 VITALS
WEIGHT: 144 LBS | DIASTOLIC BLOOD PRESSURE: 72 MMHG | HEART RATE: 84 BPM | SYSTOLIC BLOOD PRESSURE: 124 MMHG | BODY MASS INDEX: 24.59 KG/M2 | HEIGHT: 64 IN | OXYGEN SATURATION: 98 %

## 2025-07-15 DIAGNOSIS — F90.2 ATTENTION DEFICIT HYPERACTIVITY DISORDER, COMBINED TYPE: Primary | ICD-10-CM

## 2025-07-15 DIAGNOSIS — Z13.30 ENCOUNTER FOR BEHAVIORAL HEALTH SCREENING: ICD-10-CM

## 2025-07-15 RX ORDER — DEXTROAMPHETAMINE SACCHARATE, AMPHETAMINE ASPARTATE, DEXTROAMPHETAMINE SULFATE AND AMPHETAMINE SULFATE 2.5; 2.5; 2.5; 2.5 MG/1; MG/1; MG/1; MG/1
TABLET ORAL
Qty: 45 TABLET | Refills: 0 | Status: SHIPPED | OUTPATIENT
Start: 2025-07-15

## 2025-07-15 NOTE — PROGRESS NOTES
Follow Up Office Visit      Patient Name: Lara Mclaughlin  : 1995   MRN: [id number]     Referring Provider: Charline Gomez PA-C    Chief Complaint:      ICD-10-CM ICD-9-CM   1. Attention deficit hyperactivity disorder, combined type  F90.2 314.01   2. Encounter for behavioral health screening  Z13.30 V82.89        History of Present Illness:   Lara Mclaughlin is a 29 y.o. female who is here today for follow up and medication management    Subjective      Patient Reports:   History of Present Illness  The patient presents for medication management.    During the last visit, a half tablet was added to the regimen, which she reports as beneficial. She takes the half tablet at 1:00 PM, which sustains her until she returns home from work around 6:00 PM. She experiences a slight decrease in energy at this point but does not feel overly fatigued. The medication aids her focus and concentration. She has no questions or concerns. She has missed a few doses recently, including today, but generally takes it daily, including on weekends. She reports she took the medication the day before her last visit which is why it was not in her previous urine sample. She reports sleeping 7 hours per night which has improved since purchasing a new mattress. She reports slightly decreased appetite but continues to eat regular meals. She denies suicidal ideation, homicidal thoughts, or hallucinations.       Review of Systems:   Review of Systems   Constitutional:  Negative for appetite change and unexpected weight change.   Eyes:  Negative for visual disturbance.   Respiratory:  Negative for chest tightness and shortness of breath.    Cardiovascular:  Negative for chest pain.   Musculoskeletal:  Negative for gait problem.   Skin:  Negative for rash and wound.   Neurological:  Negative for dizziness, tremors, seizures, weakness and light-headedness.   Psychiatric/Behavioral:  Negative for agitation, behavioral problems,  confusion, dysphoric mood, hallucinations, self-injury and suicidal ideas. The patient is not nervous/anxious and is not hyperactive.      Sleep pattern: 7 hours  Appetite: slightly decreased     PHQ-9 Depression Screening  Little interest or pleasure in doing things? Several days   Feeling down, depressed, or hopeless? Several days   PHQ-2 Total Score 2   Trouble falling or staying asleep, or sleeping too much? Over half   Feeling tired or having little energy? Several days   Poor appetite or overeating? Several days   Feeling bad about yourself - or that you are a failure or have let yourself or your family down? Not at all   Trouble concentrating on things, such as reading the newspaper or watching television? Several days   Moving or speaking so slowly that other people could have noticed? Or the opposite - being so fidgety or restless that you have been moving around a lot more than usual? Not at all   Thoughts that you would be better off dead, or of hurting yourself in some way? Not at all   PHQ-9 Total Score 7   If you checked off any problems, how difficult have these problems made it for you to do your work, take care of things at home, or get along with other people? Somewhat difficult       ELIA-7 Anxiety Screening  Over the last two weeks, how often have you been bothered by the following problems?  Feeling nervous, anxious or on edge: Several days  Not being able to stop or control worrying: Several days  Worrying too much about different things: Several days  Trouble Relaxing: Several days  Being so restless that it is hard to sit still: Several days  Becoming easily annoyed or irritable: Not at all  Feeling afraid as if something awful might happen: Not at all  ELIA 7 Total Score: 5  If you checked any problems, how difficult have these problems made it for you to do your work, take care of things at home, or get along with other people: Somewhat difficult    RISK ASSESSMENT:  Patient denies any  "thoughts or intent of suicide today. Patient denies any impulsive behavior today.     Medications:     Current Outpatient Medications:     amphetamine-dextroamphetamine (Adderall) 10 MG tablet, Take one tablet by mouth every morning and take 1/2 tablet by mouth every afternoon by 1 pm, Disp: 45 tablet, Rfl: 0    Paragard Intrauterine Copper intrauterine device IUD, Paragard Intrauterine Copper, Disp: , Rfl:     Medication Considerations:  RICHELLE reviewed and appropriate.      Allergies:   Allergies   Allergen Reactions    Etonogestrel-Ethinyl Estradiol Nausea And Vomiting    Lactose Unknown (See Comments)     GI issue       Results Reviewed:   Results  Discussed most recent UDS    Objective     Physical Exam:  Vital Signs:   Vitals:    07/15/25 1654 07/15/25 1655   BP:  124/72   Pulse:  84   SpO2:  98%   Weight:  65.3 kg (144 lb)   Height: 162.2 cm (63.86\")      Body mass index is 24.83 kg/m².     Mental Status Exam:   Hygiene:   good   Cooperation:  Cooperative  Eye Contact:  Good  Psychomotor Behavior:  Appropriate  Affect:  Full range and Appropriate  Mood: normal  Speech:  Normal  Thought Process:  Goal directed and Linear  Thought Content:  Normal  Suicidal:  None  Homicidal:  None  Hallucinations:  None  Delusion:  None  Memory:  Intact  Orientation:  Person, Place, Time, and Situation  Reliability:  good  Insight:  Good  Judgement:  Good  Impulse Control:  Good  Physical/Medical Issues:  see problem list     Assessment / Plan      Visit Diagnosis/Orders Placed This Visit:  Diagnoses and all orders for this visit:    1. Attention deficit hyperactivity disorder, combined type (Primary)  -     amphetamine-dextroamphetamine (Adderall) 10 MG tablet; Take one tablet by mouth every morning and take 1/2 tablet by mouth every afternoon by 1 pm  Dispense: 45 tablet; Refill: 0    2. Encounter for behavioral health screening             Functional Status: No impairment    Prognosis: Good with Ongoing Treatment "     Impression/Formulation:  Patient appeared alert and oriented. Patient is voluntarily requesting to continue outpatient psychiatric treatment at [facility]. Patient is receptive to assistance with maintaining a stable lifestyle. Patient presents with history of     ICD-10-CM ICD-9-CM   1. Attention deficit hyperactivity disorder, combined type  F90.2 314.01   2. Encounter for behavioral health screening  Z13.30 V82.89   .    Reviewed patient's previous provider notes. Reviewed most recent labs. Patient meets DSM V diagnostic criteria for diagnoses. Diagnoses may be updated as more information becomes available.     Assessment & Plan    - Discussed the patient's adherence to medication, including instances of forgetting to take it on weekends and holidays.  - Explored the impact of medication on sleep, appetite, and side effects such as heart racing, irritability, or agitation.  - Addressed the need for consistent medication.    The patient reports generally positive effects from the medication, including improved sleep and concentration. There are no significant side effects noted, and the patient denies any suicidal thoughts, thoughts of harming others, or hallucinations.       Plan:  - Continue Adderall 10 mg in the morning and 5 mg at 1pm  - Conduct a follow-up urine drug screen before the next appointment.  - Perform another urine drug screen on the day of the next appointment.    Follow-up:  - A follow-up appointment is scheduled for 08/2025.      Patient will continue supportive psychotherapy efforts and medications as indicated. Clinic will obtain release of information for current treatment team for continuity of care as needed. Patient will contact this office, call [phone] or present to the nearest emergency room should suicidal or homicidal ideations occur.  Discussed medication options and treatment plan of prescribed medication(s) as well as the risks, benefits, and potential side  effects. Patient acknowledged and verbally consented to continue with current treatment plan and was educated on the importance of compliance with treatment and follow-up appointments.     Patient instructions:  Reviewed patient's RICHELLE. Discussed the risks including side effects, benefits and alternatives of stimulant medication use. We talked about the fact that these medications are schedule II controlled substances as well as the risks for potential addiction. We also discussed how this medication will not be refilled early and the medication should not be shared or taken in any manner outside of as prescribed. We discussed the potential for decreased appetite, weight loss and cardiac effects as well. Also, advised patient to watch for heart racing, palpitations, chest pain, high blood pressure, increased irritability or agitation related to stimulant use. This medication can be discontinued at the provider's discretion if misuse or addiction is suspected.          Patient or patient representative verbalized consent for the use of Ambient Listening during the visit with  NADINE Alba for chart documentation. 7/15/2025  17:12 EDT    NADINE Alba, [facility]-BC [facility]

## 2025-07-23 ENCOUNTER — CLINICAL SUPPORT (OUTPATIENT)
Dept: INTERNAL MEDICINE | Facility: CLINIC | Age: 30
End: 2025-07-23
Payer: OTHER GOVERNMENT

## 2025-07-23 DIAGNOSIS — Z79.899 LONG-TERM USE OF HIGH-RISK MEDICATION: Primary | ICD-10-CM

## 2025-07-31 ENCOUNTER — TELEPHONE (OUTPATIENT)
Dept: BEHAVIORAL HEALTH | Facility: CLINIC | Age: 30
End: 2025-07-31
Payer: OTHER GOVERNMENT

## 2025-07-31 LAB — DRUGS UR: NORMAL

## 2025-07-31 NOTE — TELEPHONE ENCOUNTER
----- Message from Margaret Hernández sent at 7/31/2025  3:46 PM EDT -----  Regarding: UDS  Patient had cocaine in her UDS so can you let her know that I will not be prescribing a stimulant any more?